# Patient Record
Sex: MALE | Race: WHITE | NOT HISPANIC OR LATINO | URBAN - METROPOLITAN AREA
[De-identification: names, ages, dates, MRNs, and addresses within clinical notes are randomized per-mention and may not be internally consistent; named-entity substitution may affect disease eponyms.]

---

## 2017-01-30 RX ORDER — ESCITALOPRAM OXALATE 10 MG/1
TABLET ORAL
Qty: 90 TABLET | Refills: 0 | Status: SHIPPED | OUTPATIENT
Start: 2017-01-30

## 2017-01-30 RX ORDER — BUPROPION HYDROCHLORIDE 300 MG/1
TABLET ORAL
Qty: 90 TABLET | Refills: 0 | Status: SHIPPED | OUTPATIENT
Start: 2017-01-30

## 2017-02-14 ENCOUNTER — TELEPHONE (OUTPATIENT)
Dept: INTERNAL MEDICINE | Age: 37
End: 2017-02-14

## 2017-02-14 NOTE — TELEPHONE ENCOUNTER
NOTIFIED PHARMACY 2-14-17 @ 11:21 THAT THE PATIENT IS NO LONGER A PATIENT OF . HE MOVED TO [place] THE FIRST OF January 2017 AND HE STATED HE IS GETTING A NEW DOCTOR. I INFORMED HIM THAT IF HE WANTED HIS RECORDS TRANSFERRED TO SIGN A MEDICAL RELEASE FORM AND WE WOULD SEND THEM TO HIM. HE AGREED TO DO SO. I ALSO INFORMED HIM THAT HE WOULD NOT GET A REFILL ON THIS MEDICATION BECAUSE IT HAS BEEN OVER A YEAR SINCE IT HAD BEEN FILLED AND HE WOULD HAVE TO BEEN SEEN FIRST. HE SAID OKAY.

## 2017-03-19 ENCOUNTER — EMERGENCY (EMERGENCY)
Facility: HOSPITAL | Age: 37
LOS: 1 days | Discharge: HOME | End: 2017-03-19
Attending: EMERGENCY MEDICINE | Admitting: EMERGENCY MEDICINE
Payer: MEDICARE

## 2017-03-19 VITALS
WEIGHT: 235.89 LBS | RESPIRATION RATE: 18 BRPM | HEART RATE: 108 BPM | TEMPERATURE: 100 F | DIASTOLIC BLOOD PRESSURE: 77 MMHG | SYSTOLIC BLOOD PRESSURE: 125 MMHG | HEIGHT: 72 IN | OXYGEN SATURATION: 95 %

## 2017-03-19 VITALS
SYSTOLIC BLOOD PRESSURE: 126 MMHG | TEMPERATURE: 99 F | HEART RATE: 114 BPM | RESPIRATION RATE: 18 BRPM | OXYGEN SATURATION: 95 % | DIASTOLIC BLOOD PRESSURE: 85 MMHG

## 2017-03-19 DIAGNOSIS — E11.9 TYPE 2 DIABETES MELLITUS WITHOUT COMPLICATIONS: ICD-10-CM

## 2017-03-19 DIAGNOSIS — R06.6 HICCOUGH: ICD-10-CM

## 2017-03-19 DIAGNOSIS — I10 ESSENTIAL (PRIMARY) HYPERTENSION: ICD-10-CM

## 2017-03-19 DIAGNOSIS — Z79.899 OTHER LONG TERM (CURRENT) DRUG THERAPY: ICD-10-CM

## 2017-03-19 LAB
ALBUMIN SERPL ELPH-MCNC: 3.9 G/DL — SIGNIFICANT CHANGE UP (ref 3.4–5)
ALP SERPL-CCNC: 83 U/L — SIGNIFICANT CHANGE UP (ref 40–120)
ALT FLD-CCNC: 134 U/L — HIGH (ref 12–42)
ANION GAP SERPL CALC-SCNC: 8 MMOL/L — LOW (ref 9–16)
AST SERPL-CCNC: 83 U/L — HIGH (ref 15–37)
BILIRUB SERPL-MCNC: 0.9 MG/DL — SIGNIFICANT CHANGE UP (ref 0.2–1.2)
BUN SERPL-MCNC: 12 MG/DL — SIGNIFICANT CHANGE UP (ref 7–23)
CALCIUM SERPL-MCNC: 8.6 MG/DL — SIGNIFICANT CHANGE UP (ref 8.5–10.5)
CHLORIDE SERPL-SCNC: 103 MMOL/L — SIGNIFICANT CHANGE UP (ref 96–108)
CO2 SERPL-SCNC: 27 MMOL/L — SIGNIFICANT CHANGE UP (ref 22–31)
CREAT SERPL-MCNC: 1.22 MG/DL — SIGNIFICANT CHANGE UP (ref 0.5–1.3)
GLUCOSE SERPL-MCNC: 118 MG/DL — HIGH (ref 70–99)
HCT VFR BLD CALC: 44.9 % — SIGNIFICANT CHANGE UP (ref 39–50)
HGB BLD-MCNC: 15.7 G/DL — SIGNIFICANT CHANGE UP (ref 13–17)
LIDOCAIN IGE QN: 91 U/L — SIGNIFICANT CHANGE UP (ref 73–393)
MCHC RBC-ENTMCNC: 29.6 PG — SIGNIFICANT CHANGE UP (ref 27–34)
MCHC RBC-ENTMCNC: 35 G/DL — SIGNIFICANT CHANGE UP (ref 32–36)
MCV RBC AUTO: 84.7 FL — SIGNIFICANT CHANGE UP (ref 80–100)
PLATELET # BLD AUTO: 195 K/UL — SIGNIFICANT CHANGE UP (ref 150–400)
POTASSIUM SERPL-MCNC: 3.8 MMOL/L — SIGNIFICANT CHANGE UP (ref 3.5–5.3)
POTASSIUM SERPL-SCNC: 3.8 MMOL/L — SIGNIFICANT CHANGE UP (ref 3.5–5.3)
PROT SERPL-MCNC: 7.5 G/DL — SIGNIFICANT CHANGE UP (ref 6.4–8.2)
RBC # BLD: 5.3 M/UL — SIGNIFICANT CHANGE UP (ref 4.2–5.8)
RBC # FLD: 12.8 % — SIGNIFICANT CHANGE UP (ref 10.3–16.9)
SODIUM SERPL-SCNC: 138 MMOL/L — SIGNIFICANT CHANGE UP (ref 135–145)
WBC # BLD: 13 K/UL — HIGH (ref 3.8–10.5)
WBC # FLD AUTO: 13 K/UL — HIGH (ref 3.8–10.5)

## 2017-03-19 PROCEDURE — 74177 CT ABD & PELVIS W/CONTRAST: CPT | Mod: 26

## 2017-03-19 PROCEDURE — 71020: CPT | Mod: 26

## 2017-03-19 PROCEDURE — 93010 ELECTROCARDIOGRAM REPORT: CPT

## 2017-03-19 PROCEDURE — 99284 EMERGENCY DEPT VISIT MOD MDM: CPT | Mod: 25

## 2017-03-19 RX ORDER — CHLORPROMAZINE HCL 10 MG
25 TABLET ORAL ONCE
Qty: 0 | Refills: 0 | Status: COMPLETED | OUTPATIENT
Start: 2017-03-19 | End: 2017-03-19

## 2017-03-19 RX ORDER — IOHEXOL 300 MG/ML
50 INJECTION, SOLUTION INTRAVENOUS ONCE
Qty: 0 | Refills: 0 | Status: COMPLETED | OUTPATIENT
Start: 2017-03-19 | End: 2017-03-19

## 2017-03-19 RX ORDER — SODIUM CHLORIDE 9 MG/ML
1000 INJECTION INTRAMUSCULAR; INTRAVENOUS; SUBCUTANEOUS ONCE
Qty: 0 | Refills: 0 | Status: COMPLETED | OUTPATIENT
Start: 2017-03-19 | End: 2017-03-19

## 2017-03-19 RX ORDER — FAMOTIDINE 10 MG/ML
20 INJECTION INTRAVENOUS ONCE
Qty: 0 | Refills: 0 | Status: COMPLETED | OUTPATIENT
Start: 2017-03-19 | End: 2017-03-19

## 2017-03-19 RX ADMIN — FAMOTIDINE 20 MILLIGRAM(S): 10 INJECTION INTRAVENOUS at 20:29

## 2017-03-19 RX ADMIN — SODIUM CHLORIDE 1000 MILLILITER(S): 9 INJECTION INTRAMUSCULAR; INTRAVENOUS; SUBCUTANEOUS at 20:29

## 2017-03-19 RX ADMIN — IOHEXOL 50 MILLILITER(S): 300 INJECTION, SOLUTION INTRAVENOUS at 20:29

## 2017-03-19 NOTE — ED PROVIDER NOTE - ENMT, MLM
Airway patent, Nasal mucosa clear. Mouth with normal mucosa. Airway patent, Nasal mucosa clear. Mouth with normal mucosa. oropharynx clear

## 2017-03-19 NOTE — ED PROVIDER NOTE - PROGRESS NOTE DETAILS
Discussed with Saloni from pharmacy re: interaction between Thorazine, Lexapro, and Wellbutrin. Concern for QT prolongation; however, pt with normal QT interval on EKG. Will give 25 mg slowly with fluid and monitor closely for hypotension, arrhythmia. Pt's hiccups stopped. Will hold Thorazine. Pt's CT scan shows no e/o obstruction/abscess. Pt's symptoms completely resolved, obs'd in ED for several hours without recurrence of sx, will discharge home. Pt's CT scan shows no e/o obstruction/abscess. Pt's symptoms completely resolved, obs'd in ED for several hours without recurrence of sx, will discharge home. Pt states he feels fine, desires to be discharged home.

## 2017-03-19 NOTE — ED PROVIDER NOTE - NEUROLOGICAL, MLM
Alert and oriented, no focal deficits, no motor or sensory deficits. Alert and oriented, no focal deficits, no motor or sensory deficits. cn grossly intact, strength 5/5 in bl ue bl le, sensation intact to

## 2017-03-19 NOTE — ED PROVIDER NOTE - OBJECTIVE STATEMENT
36M with DM, HTN, depression presenting with 30 hours of hiccups. No abd surgeries, recently tx for sinusitis with flonase. Denies 36M with DM, HTN, depression presenting with 30 hours of hiccups. No abd surgeries, recently tx for sinusitis with flonase. Denies abd pain, nausea, vomiting. Last BM yesterday, unclear if pt has had flatus. No hx of abd surg. 36M with DM, HTN, depression presenting with 30 hours of hiccups. No abd surgeries, recently tx for sinusitis with flonase. Denies abd pain, nausea, vomiting. Last BM yesterday, unclear if pt has had flatus. No hx of abd surg. Pt has tried Reglan w/o relief. 36M with DM, HTN, depression presenting with 30 hours of hiccups. No abd surgeries. Denies abd pain, nausea, vomiting. Last BM yesterday, unclear if pt has had flatus. No hx of abd surg. Pt has tried Reglan w/o relief. Denies throat pain, cough. No fever. Recently tx for sinusitis with mucinex and flonase. No recent abx use. For hiccups pt has tried home remedies of sugar water, holding breath--without relief.

## 2017-03-19 NOTE — ED ADULT NURSE NOTE - OBJECTIVE STATEMENT
Patient presents to the ED complaining of hiccups for the past 30 hours. Patient reports that they started spontaneously. Patient states that he went to an urgent care where they gave him reglan tablets, with no relief. Denies any abdominal issues. Last BM was yesterday. No fever or chills. Patient presents to the ED complaining of hiccups for the past 30 hours. Patient reports that they started spontaneously. Patient states that he went to an urgent care where they gave him reglan tablets, with no relief. Denies any abdominal issues. Last BM was yesterday. No fever or chills. Denies any chest pain.

## 2017-03-20 ENCOUNTER — INPATIENT (INPATIENT)
Facility: HOSPITAL | Age: 37
LOS: 0 days | Discharge: HOME | DRG: 871 | End: 2017-03-21
Attending: STUDENT IN AN ORGANIZED HEALTH CARE EDUCATION/TRAINING PROGRAM | Admitting: STUDENT IN AN ORGANIZED HEALTH CARE EDUCATION/TRAINING PROGRAM
Payer: COMMERCIAL

## 2017-03-20 VITALS
SYSTOLIC BLOOD PRESSURE: 157 MMHG | HEART RATE: 117 BPM | WEIGHT: 229.94 LBS | RESPIRATION RATE: 20 BRPM | TEMPERATURE: 102 F | OXYGEN SATURATION: 90 % | DIASTOLIC BLOOD PRESSURE: 94 MMHG

## 2017-03-20 DIAGNOSIS — J02.0 STREPTOCOCCAL PHARYNGITIS: ICD-10-CM

## 2017-03-20 DIAGNOSIS — R63.8 OTHER SYMPTOMS AND SIGNS CONCERNING FOOD AND FLUID INTAKE: ICD-10-CM

## 2017-03-20 DIAGNOSIS — E11.9 TYPE 2 DIABETES MELLITUS WITHOUT COMPLICATIONS: ICD-10-CM

## 2017-03-20 DIAGNOSIS — G93.40 ENCEPHALOPATHY, UNSPECIFIED: ICD-10-CM

## 2017-03-20 DIAGNOSIS — A41.9 SEPSIS, UNSPECIFIED ORGANISM: ICD-10-CM

## 2017-03-20 DIAGNOSIS — Z41.8 ENCOUNTER FOR OTHER PROCEDURES FOR PURPOSES OTHER THAN REMEDYING HEALTH STATE: ICD-10-CM

## 2017-03-20 DIAGNOSIS — I10 ESSENTIAL (PRIMARY) HYPERTENSION: ICD-10-CM

## 2017-03-20 DIAGNOSIS — R06.82 TACHYPNEA, NOT ELSEWHERE CLASSIFIED: ICD-10-CM

## 2017-03-20 LAB
ALBUMIN SERPL ELPH-MCNC: 3.9 G/DL — SIGNIFICANT CHANGE UP (ref 3.4–5)
ALP SERPL-CCNC: 79 U/L — SIGNIFICANT CHANGE UP (ref 40–120)
ALT FLD-CCNC: 122 U/L — HIGH (ref 12–42)
ANION GAP SERPL CALC-SCNC: 8 MMOL/L — LOW (ref 9–16)
APPEARANCE UR: CLEAR — SIGNIFICANT CHANGE UP
AST SERPL-CCNC: 73 U/L — HIGH (ref 15–37)
BASE EXCESS BLDV CALC-SCNC: 1.1 MMOL/L — SIGNIFICANT CHANGE UP
BASOPHILS NFR BLD AUTO: 0.1 % — SIGNIFICANT CHANGE UP (ref 0–2)
BILIRUB SERPL-MCNC: 1.8 MG/DL — HIGH (ref 0.2–1.2)
BILIRUB UR-MCNC: NEGATIVE — SIGNIFICANT CHANGE UP
BUN SERPL-MCNC: 13 MG/DL — SIGNIFICANT CHANGE UP (ref 7–23)
CALCIUM SERPL-MCNC: 9 MG/DL — SIGNIFICANT CHANGE UP (ref 8.5–10.5)
CHLORIDE SERPL-SCNC: 98 MMOL/L — SIGNIFICANT CHANGE UP (ref 96–108)
CO2 SERPL-SCNC: 29 MMOL/L — SIGNIFICANT CHANGE UP (ref 22–31)
COLOR SPEC: YELLOW — SIGNIFICANT CHANGE UP
CREAT SERPL-MCNC: 1.19 MG/DL — SIGNIFICANT CHANGE UP (ref 0.5–1.3)
DIFF PNL FLD: (no result)
EOSINOPHIL NFR BLD AUTO: 0 % — SIGNIFICANT CHANGE UP (ref 0–6)
GAS PNL BLDV: SIGNIFICANT CHANGE UP
GLUCOSE SERPL-MCNC: 173 MG/DL — HIGH (ref 70–99)
GLUCOSE UR QL: NEGATIVE — SIGNIFICANT CHANGE UP
HCO3 BLDV-SCNC: 26 MMOL/L — SIGNIFICANT CHANGE UP (ref 20–27)
HCT VFR BLD CALC: 45.1 % — SIGNIFICANT CHANGE UP (ref 39–50)
HETEROPH AB TITR SER AGGL: NEGATIVE — SIGNIFICANT CHANGE UP
HGB BLD-MCNC: 16 G/DL — SIGNIFICANT CHANGE UP (ref 13–17)
HMPV RNA SPEC QL NAA+PROBE: DETECTED
KETONES UR-MCNC: 15 MG/DL
LACTATE SERPL-SCNC: 1.9 MMOL/L — SIGNIFICANT CHANGE UP (ref 0.5–2)
LEUKOCYTE ESTERASE UR-ACNC: NEGATIVE — SIGNIFICANT CHANGE UP
LYMPHOCYTES # BLD AUTO: 3.8 % — LOW (ref 13–44)
MCHC RBC-ENTMCNC: 30.5 PG — SIGNIFICANT CHANGE UP (ref 27–34)
MCHC RBC-ENTMCNC: 35.5 G/DL — SIGNIFICANT CHANGE UP (ref 32–36)
MCV RBC AUTO: 86.1 FL — SIGNIFICANT CHANGE UP (ref 80–100)
MONOCYTES NFR BLD AUTO: 6.2 % — SIGNIFICANT CHANGE UP (ref 2–14)
NEUTROPHILS NFR BLD AUTO: 89.9 % — HIGH (ref 43–77)
NITRITE UR-MCNC: NEGATIVE — SIGNIFICANT CHANGE UP
PCO2 BLDV: 41 MMHG — SIGNIFICANT CHANGE UP (ref 41–51)
PH BLDV: 7.41 — SIGNIFICANT CHANGE UP (ref 7.32–7.43)
PH UR: 6 — SIGNIFICANT CHANGE UP (ref 4–8)
PLATELET # BLD AUTO: 181 K/UL — SIGNIFICANT CHANGE UP (ref 150–400)
PO2 BLDV: 22 MMHG — SIGNIFICANT CHANGE UP
POTASSIUM SERPL-MCNC: 3.6 MMOL/L — SIGNIFICANT CHANGE UP (ref 3.5–5.3)
POTASSIUM SERPL-SCNC: 3.6 MMOL/L — SIGNIFICANT CHANGE UP (ref 3.5–5.3)
PROT SERPL-MCNC: 8.3 G/DL — HIGH (ref 6.4–8.2)
PROT UR-MCNC: (no result) MG/DL
RAPID RVP RESULT: DETECTED
RBC # BLD: 5.24 M/UL — SIGNIFICANT CHANGE UP (ref 4.2–5.8)
RBC # FLD: 13.3 % — SIGNIFICANT CHANGE UP (ref 10.3–16.9)
S PYO AG SPEC QL IA: NEGATIVE — SIGNIFICANT CHANGE UP
SAO2 % BLDV: 42 % — SIGNIFICANT CHANGE UP
SODIUM SERPL-SCNC: 135 MMOL/L — SIGNIFICANT CHANGE UP (ref 135–145)
SP GR SPEC: 1.01 — SIGNIFICANT CHANGE UP (ref 1–1.03)
UROBILINOGEN FLD QL: 1 E.U./DL — SIGNIFICANT CHANGE UP
WBC # BLD: 16.7 K/UL — HIGH (ref 3.8–10.5)
WBC # FLD AUTO: 16.7 K/UL — HIGH (ref 3.8–10.5)

## 2017-03-20 PROCEDURE — 99284 EMERGENCY DEPT VISIT MOD MDM: CPT | Mod: 25

## 2017-03-20 PROCEDURE — 71046 X-RAY EXAM CHEST 2 VIEWS: CPT

## 2017-03-20 PROCEDURE — 74177 CT ABD & PELVIS W/CONTRAST: CPT

## 2017-03-20 PROCEDURE — 93005 ELECTROCARDIOGRAM TRACING: CPT

## 2017-03-20 PROCEDURE — 93010 ELECTROCARDIOGRAM REPORT: CPT

## 2017-03-20 PROCEDURE — 99292 CRITICAL CARE ADDL 30 MIN: CPT | Mod: 25

## 2017-03-20 PROCEDURE — 70491 CT SOFT TISSUE NECK W/DYE: CPT | Mod: 26

## 2017-03-20 PROCEDURE — 71010: CPT | Mod: 26

## 2017-03-20 PROCEDURE — 96374 THER/PROPH/DIAG INJ IV PUSH: CPT | Mod: XU

## 2017-03-20 PROCEDURE — 99222 1ST HOSP IP/OBS MODERATE 55: CPT | Mod: GC

## 2017-03-20 PROCEDURE — 99291 CRITICAL CARE FIRST HOUR: CPT | Mod: 25

## 2017-03-20 RX ORDER — AMPICILLIN SODIUM AND SULBACTAM SODIUM 250; 125 MG/ML; MG/ML
3 INJECTION, POWDER, FOR SUSPENSION INTRAMUSCULAR; INTRAVENOUS ONCE
Qty: 0 | Refills: 0 | Status: COMPLETED | OUTPATIENT
Start: 2017-03-20 | End: 2017-03-20

## 2017-03-20 RX ORDER — INSULIN LISPRO 100/ML
VIAL (ML) SUBCUTANEOUS
Qty: 0 | Refills: 0 | Status: DISCONTINUED | OUTPATIENT
Start: 2017-03-20 | End: 2017-03-21

## 2017-03-20 RX ORDER — HEPARIN SODIUM 5000 [USP'U]/ML
5000 INJECTION INTRAVENOUS; SUBCUTANEOUS EVERY 8 HOURS
Qty: 0 | Refills: 0 | Status: DISCONTINUED | OUTPATIENT
Start: 2017-03-20 | End: 2017-03-21

## 2017-03-20 RX ORDER — BUPROPION HYDROCHLORIDE 150 MG/1
1 TABLET, EXTENDED RELEASE ORAL
Qty: 0 | Refills: 0 | COMMUNITY

## 2017-03-20 RX ORDER — SODIUM CHLORIDE 9 MG/ML
1000 INJECTION INTRAMUSCULAR; INTRAVENOUS; SUBCUTANEOUS ONCE
Qty: 0 | Refills: 0 | Status: COMPLETED | OUTPATIENT
Start: 2017-03-20 | End: 2017-03-20

## 2017-03-20 RX ORDER — VALSARTAN 80 MG/1
0 TABLET ORAL
Qty: 0 | Refills: 0 | COMMUNITY

## 2017-03-20 RX ORDER — ESCITALOPRAM OXALATE 10 MG/1
1 TABLET, FILM COATED ORAL
Qty: 0 | Refills: 0 | COMMUNITY

## 2017-03-20 RX ORDER — DEXAMETHASONE 0.5 MG/5ML
10 ELIXIR ORAL ONCE
Qty: 0 | Refills: 0 | Status: COMPLETED | OUTPATIENT
Start: 2017-03-20 | End: 2017-03-20

## 2017-03-20 RX ORDER — SODIUM CHLORIDE 9 MG/ML
500 INJECTION INTRAMUSCULAR; INTRAVENOUS; SUBCUTANEOUS
Qty: 0 | Refills: 0 | Status: COMPLETED | OUTPATIENT
Start: 2017-03-20 | End: 2017-03-20

## 2017-03-20 RX ORDER — METFORMIN HYDROCHLORIDE 850 MG/1
1 TABLET ORAL
Qty: 0 | Refills: 0 | COMMUNITY

## 2017-03-20 RX ORDER — ACETAMINOPHEN 500 MG
1000 TABLET ORAL ONCE
Qty: 0 | Refills: 0 | Status: COMPLETED | OUTPATIENT
Start: 2017-03-20 | End: 2017-03-20

## 2017-03-20 RX ORDER — SODIUM CHLORIDE 9 MG/ML
3 INJECTION INTRAMUSCULAR; INTRAVENOUS; SUBCUTANEOUS ONCE
Qty: 0 | Refills: 0 | Status: COMPLETED | OUTPATIENT
Start: 2017-03-20 | End: 2017-03-20

## 2017-03-20 RX ORDER — BUPROPION HYDROCHLORIDE 150 MG/1
150 TABLET, EXTENDED RELEASE ORAL
Qty: 0 | Refills: 0 | Status: DISCONTINUED | OUTPATIENT
Start: 2017-03-20 | End: 2017-03-21

## 2017-03-20 RX ORDER — VALSARTAN 80 MG/1
1 TABLET ORAL
Qty: 0 | Refills: 0 | COMMUNITY

## 2017-03-20 RX ORDER — AMPICILLIN SODIUM AND SULBACTAM SODIUM 250; 125 MG/ML; MG/ML
3 INJECTION, POWDER, FOR SUSPENSION INTRAMUSCULAR; INTRAVENOUS EVERY 6 HOURS
Qty: 0 | Refills: 0 | Status: DISCONTINUED | OUTPATIENT
Start: 2017-03-20 | End: 2017-03-21

## 2017-03-20 RX ORDER — ESCITALOPRAM OXALATE 10 MG/1
10 TABLET, FILM COATED ORAL DAILY
Qty: 0 | Refills: 0 | Status: DISCONTINUED | OUTPATIENT
Start: 2017-03-20 | End: 2017-03-21

## 2017-03-20 RX ORDER — ACETAMINOPHEN 500 MG
650 TABLET ORAL EVERY 6 HOURS
Qty: 0 | Refills: 0 | Status: DISCONTINUED | OUTPATIENT
Start: 2017-03-20 | End: 2017-03-21

## 2017-03-20 RX ORDER — IPRATROPIUM/ALBUTEROL SULFATE 18-103MCG
3 AEROSOL WITH ADAPTER (GRAM) INHALATION ONCE
Qty: 0 | Refills: 0 | Status: COMPLETED | OUTPATIENT
Start: 2017-03-20 | End: 2017-03-20

## 2017-03-20 RX ORDER — AMPICILLIN SODIUM AND SULBACTAM SODIUM 250; 125 MG/ML; MG/ML
3 INJECTION, POWDER, FOR SUSPENSION INTRAMUSCULAR; INTRAVENOUS EVERY 6 HOURS
Qty: 0 | Refills: 0 | Status: DISCONTINUED | OUTPATIENT
Start: 2017-03-20 | End: 2017-03-20

## 2017-03-20 RX ADMIN — HEPARIN SODIUM 5000 UNIT(S): 5000 INJECTION INTRAVENOUS; SUBCUTANEOUS at 21:49

## 2017-03-20 RX ADMIN — AMPICILLIN SODIUM AND SULBACTAM SODIUM 200 GRAM(S): 250; 125 INJECTION, POWDER, FOR SUSPENSION INTRAMUSCULAR; INTRAVENOUS at 14:58

## 2017-03-20 RX ADMIN — SODIUM CHLORIDE 2000 MILLILITER(S): 9 INJECTION INTRAMUSCULAR; INTRAVENOUS; SUBCUTANEOUS at 14:49

## 2017-03-20 RX ADMIN — Medication 1000 MILLIGRAM(S): at 14:47

## 2017-03-20 RX ADMIN — Medication 102 MILLIGRAM(S): at 14:47

## 2017-03-20 RX ADMIN — SODIUM CHLORIDE 2000 MILLILITER(S): 9 INJECTION INTRAMUSCULAR; INTRAVENOUS; SUBCUTANEOUS at 17:32

## 2017-03-20 RX ADMIN — SODIUM CHLORIDE 2000 MILLILITER(S): 9 INJECTION INTRAMUSCULAR; INTRAVENOUS; SUBCUTANEOUS at 14:13

## 2017-03-20 RX ADMIN — AMPICILLIN SODIUM AND SULBACTAM SODIUM 200 GRAM(S): 250; 125 INJECTION, POWDER, FOR SUSPENSION INTRAMUSCULAR; INTRAVENOUS at 22:26

## 2017-03-20 RX ADMIN — SODIUM CHLORIDE 2000 MILLILITER(S): 9 INJECTION INTRAMUSCULAR; INTRAVENOUS; SUBCUTANEOUS at 14:02

## 2017-03-20 RX ADMIN — Medication 3 MILLILITER(S): at 17:59

## 2017-03-20 RX ADMIN — Medication 2: at 21:49

## 2017-03-20 RX ADMIN — SODIUM CHLORIDE 3 MILLILITER(S): 9 INJECTION INTRAMUSCULAR; INTRAVENOUS; SUBCUTANEOUS at 14:17

## 2017-03-20 RX ADMIN — SODIUM CHLORIDE 2000 MILLILITER(S): 9 INJECTION INTRAMUSCULAR; INTRAVENOUS; SUBCUTANEOUS at 14:08

## 2017-03-20 RX ADMIN — SODIUM CHLORIDE 2000 MILLILITER(S): 9 INJECTION INTRAMUSCULAR; INTRAVENOUS; SUBCUTANEOUS at 15:52

## 2017-03-20 RX ADMIN — SODIUM CHLORIDE 2000 MILLILITER(S): 9 INJECTION INTRAMUSCULAR; INTRAVENOUS; SUBCUTANEOUS at 14:10

## 2017-03-20 NOTE — ED PROVIDER NOTE - ENMT, MLM
Airway patent. Dry mucous membranes. Unable to full visualize oropharynx despite multiple attempts with tongue depressor. Large tongue, pt unable to relax tongue. middle-top uvula erythematous with exudates. unable to visualize tonsils. no sublingual edema. no drooling or stridor

## 2017-03-20 NOTE — H&P ADULT - NSHPPHYSICALEXAM_GEN_ALL_CORE
ICU Vital Signs Last 24 Hrs  T(C): 37.4, Max: 39 (03-20 @ 13:54)  T(F): 99.4, Max: 102.2 (03-20 @ 13:54)  HR: 98 (98 - 117)  BP: 133/63 (133/63 - 159/73)  BP(mean): --  ABP: --  ABP(mean): --  RR: 20 (20 - 30)  SpO2: 94% (88% - 94%)      PHYSICAL EXAM:      Constitutional: NAD, comfortable on venti mask  Eyes: PERRLA, EOMI  ENMT: MMM, neck supple, bilateral cervical lymphadenopathy  Respiratory: CTAB, no r/r, no stridor  Cardiovascular: RRR, audible S1S2, no murmur/rub/gallop  Gastrointestinal: Soft, NDNT  Extremities: no edema, no ulceration  Vascular: DPs and Radial pulse palpaple  Neurological: AnOx3, CN II-XII intact  Skin: no visible skin rash  Musculoskeletal: full ROM on all extremities ICU Vital Signs Last 24 Hrs  T(C): 37.4, Max: 39 (03-20 @ 13:54)  T(F): 99.4, Max: 102.2 (03-20 @ 13:54)  HR: 98 (98 - 117)  BP: 133/63 (133/63 - 159/73)  BP(mean): --  ABP: --  ABP(mean): --  RR: 20 (20 - 30)  SpO2: 94% (88% - 94%)      PHYSICAL EXAM:      Constitutional: NAD, comfortable on venti mask  Eyes: PERRLA, EOMI  ENMT: MMM, neck supple, bilateral cervical lymphadenopathy, no pharyngeal exudate, + pharyngeal erythema however poor visualization  Respiratory: CTAB, no r/r, no stridor  Cardiovascular: RRR, audible S1S2, no murmur/rub/gallop  Gastrointestinal: Soft, NDNT  Extremities: no edema, no ulceration  Vascular: DPs and Radial pulse palpaple  Neurological: AnOx3, CN II-XII intact  Skin: no visible skin rash  Musculoskeletal: full ROM on all extremities ICU Vital Signs Last 24 Hrs  T(C): 37.4, Max: 39 (03-20 @ 13:54)  T(F): 99.4, Max: 102.2 (03-20 @ 13:54)  HR: 98 (98 - 117)  BP: 133/63 (133/63 - 159/73)  BP(mean): --  ABP: --  ABP(mean): --  RR: 20 (20 - 30)  SpO2: 94% (88% - 94%)      PHYSICAL EXAM:      Constitutional: NAD, comfortable on venti mask  Eyes: PERRLA, EOMI  ENMT: MMM, neck supple, bilateral cervical lymphadenopathy, no pharyngeal exudate, + pharyngeal erythema however poor visualization, no sinus tenderness  Respiratory: CTAB, no r/r, no stridor  Cardiovascular: RRR, audible S1S2, no murmur/rub/gallop  Gastrointestinal: Soft, NDNT  Extremities: no edema, no ulceration  Vascular: DPs and Radial pulse palpaple  Neurological: AnOx3, CN II-XII intact  Skin: no visible skin rash  Musculoskeletal: full ROM on all extremities

## 2017-03-20 NOTE — H&P ADULT - NSHPREVIEWOFSYSTEMS_GEN_ALL_CORE
REVIEW OF SYSTEMS      General:	Fever, malaise    Skin/Breast: no rash  	  Ophthalmologic: no visual changes  	  ENMT: sorethroat	    Respiratory and Thorax: no SOB  	  Cardiovascular: no chest pain	    Gastrointestinal:	no c/d    Genitourinary:	no dysuria    Musculoskeletal:	no joint pain    Neurological: no focal weakness/numbness	    Psychiatric:	no SI/HI

## 2017-03-20 NOTE — ED PROVIDER NOTE - CRITICAL CARE PROVIDED
consultation with other physicians/direct patient care (not related to procedure)/interpretation of diagnostic studies/additional history taking/documentation/consult w/ pt's family directly relating to pts condition

## 2017-03-20 NOTE — ED PROVIDER NOTE - CARE PLAN
Principal Discharge DX:	Sepsis, due to unspecified organism  Secondary Diagnosis:	Acute pharyngitis, unspecified etiology  Secondary Diagnosis:	Human metapneumovirus as the cause of diseases classified elsewhere

## 2017-03-20 NOTE — ED PROVIDER NOTE - SECONDARY DIAGNOSIS.
Acute pharyngitis, unspecified etiology Human metapneumovirus as the cause of diseases classified elsewhere

## 2017-03-20 NOTE — ED PROVIDER NOTE - DIAGNOSTIC INTERPRETATION
ER Physician: Evelyne Valles, DO  CHEST XRAY INTERPRETATION: lungs clear, heart shadow normal, bony structures intact. elevated R hemidiaphragm

## 2017-03-20 NOTE — H&P ADULT - PROBLEM SELECTOR PLAN 1
Fever, tachycardic, tachypnea, likely due to pharyngitis bacterial vs viral (hMVP). No other source (no GI/ complaint, negative Xray).   -rapid strep negative, however will f/u with throat culture. Continue with Unasyn for now.  -Lactate 1.9. S/p 4L NS in the ED, will hold further fluid for now as patient appeared euvolemic, will resume PO diet.  -Will f/u with blood culture. Fever, tachycardic, tachypnea, likely due to pharyngitis bacterial vs viral (hMVP vs mono). No other source (no GI/ complaint, negative Xray).   -rapid strep negative, however will f/u with throat culture. Continue with Unasyn for now.  -Lactate 1.9. S/p 4L NS in the ED, will hold further fluid for now as patient appeared euvolemic, will resume PO diet.  -Will f/u with blood culture. WBC, Fever, tachycardic, tachypnea, likely due to pharyngitis bacterial vs viral (hMVP vs mono). No other source (no GI/ complaint, negative Xray).   -rapid strep negative, however will f/u with throat culture. Continue with Unasyn for now.  -Lactate 1.9. S/p 4L NS in the ED, will hold further fluid for now as patient appeared euvolemic, will resume PO diet.  -Will f/u with blood culture.

## 2017-03-20 NOTE — H&P ADULT - HISTORY OF PRESENT ILLNESS
35 yo female with DM, HTN, ANAM on CPAP at night, depression, who presented with fever today. Patient stated he had sore throat for the past week, worsening, no associated with cough, not fever until today, no sick contact, no recent travel, no shortness of breath before admission. Also denied constipation/ diarrhea, no dysuria, no ulcers/wounds. Also reported some nasal discharge, not purulent, no sinus pain or congestion. 35 yo female with DM2, HTN, ANAM on CPAP at night, depression, who presented with fever today. Patient stated he had sore throat for the past week, worsening, no associated with cough, not fever until today, no sick contact, no recent travel, no shortness of breath before admission. Also denied constipation/ diarrhea, no dysuria, no ulcers/wounds. Also reported some nasal discharge, not purulent, no sinus pain or congestion.  Seen in ED last night for intratable hiccup, had negative CT abdomen and pelvis and was sent home. Came back to ED again today for 1 day of subjective fever and malaise.  In the ED, vitals were were T 102.2, , /94, O2 90% RA, RR 20, patient was given tylenol, decadron 10mg IV for neck swelling, given 4L NS boluses. Patient was also started on Unasyn for pharyngitis. CT neck showed pharyngitis and sinusitis. Patient suddenly became tachypnic and lethargic and confused, so patient was put on Venti mask at 50% 02. ICU was consulted. Patient however became more alert and stable. MICU stated patient is stable for floor.     PMH: as above  PSH: none  SH: no tob, no alcohol, no drug  FH: non-contributary  ALL: NKDA 35 yo male with DM2, HTN, ANAM on CPAP at night, depression, who presented with fever today. Patient stated he had sore throat for the past week, worsening, no associated with cough, not fever until today, no sick contact, no recent travel, no shortness of breath before admission. Also denied constipation/ diarrhea, no dysuria, no ulcers/wounds. Also reported some nasal discharge, not purulent, no sinus pain or congestion.  Seen in ED last night for intratable hiccup, had negative CT abdomen and pelvis and was sent home. Came back to ED again today for 1 day of subjective fever and malaise.  In the ED, vitals were were T 102.2, , /94, O2 90% RA, RR 20, patient was given tylenol, decadron 10mg IV for neck swelling, given 4L NS boluses. Patient was also started on Unasyn for pharyngitis. CT neck showed pharyngitis and sinusitis. Patient suddenly became tachypnic and lethargic and confused, so patient was put on Venti mask at 50% 02. ICU was consulted. Patient however became more alert and stable. MICU stated patient is stable for floor.     PMH: as above  PSH: none  SH: no tob, no alcohol, no drug  FH: non-contributary  ALL: NKDA

## 2017-03-20 NOTE — ED PROVIDER NOTE - MUSCULOSKELETAL, MLM
Spine appears normal, range of motion is not limited, no muscle or joint tenderness. supple neck, FROM. no meningeal signs

## 2017-03-20 NOTE — ED ADULT TRIAGE NOTE - CHIEF COMPLAINT QUOTE
weakness and cough since last night. Patient febrile on arrival. Patient reports "I was here yesterday for uncontrollable hiccups that eventually stopped on their own."

## 2017-03-20 NOTE — ED PROVIDER NOTE - PROGRESS NOTE DETAILS
Pt seen by MICU team - admit to medicine, regional Pt with increasing RR, hypoxia. Called for BiPAP. MICU eval called. Pt placed on venti-mask, ABG performed by MICU staff. Pt much more awake, alert comfortable. Pt not placed on BiPAP at this time

## 2017-03-20 NOTE — ED PROVIDER NOTE - PSYCHIATRIC, MLM
Alert and oriented to person, place, time/situation. flat, depressed affect. no apparent risk to self or others.

## 2017-03-20 NOTE — ED PROVIDER NOTE - RESPIRATORY, MLM
Breaths sounds equal and diminished b/l. No W/R/R. No increased WOB or accessory mm use. Mild tachypnea. O2 sat on RA 89%. Pt speaks in full sentences.

## 2017-03-20 NOTE — ED PROVIDER NOTE - OBJECTIVE STATEMENT
Pt with PMHx DM, HTN, depression, ANAM p/w fever, onset this morning, generalized weakness, feeling dehydrated, mild cough. Pt [place] [facility] [place] sore throat and nasal congestion. Pt was told he had sinusitis, but no abx given. Pt reports continued congestion. No purulent nasal discharge. No HA, neck pain or stiffness. No CP, SOB. No abdominal pain, n/v/d. No rash or skin infections. No F/U/D or hematuria. Pt reports his voice is "weak." No recent travel. no sick contacts. Pt did not receive a flu vaccine this year. Pt denies IVDA. Denies high-risk HIV behavior. Declines HIV testing. Denies SI, HI, AH/VH, and ingestions. Of note, pt seen the ED yesterday for hiccups. Pt had blood work, CXR, and CT abd / pelv with PO / IV contrast - WBC 13. CT revealed small hiatal hernia and GERD. CXR negative. Pt reports he felt okay when he left the ED, and woke up this way this morning. Pt with PMHx DM, HTN, depression, ANAM (CPAP at night) p/w fever, onset this morning, generalized weakness, feeling dehydrated, mild cough. Pt reports he was seen at [facility] [place] [place] ago for sore throat and nasal congestion. Pt was told he had sinusitis, but no abx given. Pt reports continued congestion. No purulent nasal discharge. No HA, neck pain or stiffness. No CP, SOB. No abdominal pain, n/v/d. No rash or skin infections. No F/U/D or hematuria. Pt reports his voice is "weak." No recent travel. no sick contacts. Pt did not receive a flu vaccine this year. Pt denies IVDA. Denies high-risk HIV behavior. Declines HIV testing. Denies SI, HI, AH/VH, and ingestions. Of note, pt seen the ED yesterday for hiccups. Pt had blood work, CXR, and CT abd / pelv with PO / IV contrast - WBC 13. CT revealed small hiatal hernia and GERD. CXR negative. Pt reports he felt okay when he left the ED, and woke up this way this morning.

## 2017-03-20 NOTE — H&P ADULT - NSHPLABSRESULTS_GEN_ALL_CORE
16.0   16.7  )-----------( 181      ( 20 Mar 2017 14:20 )             45.1     20 Mar 2017 14:20    135    |  98     |  13     ----------------------------<  173    3.6     |  29     |  1.19     Ca    9.0        20 Mar 2017 14:20    TPro  8.3    /  Alb  3.9    /  TBili  1.8    /  DBili  x      /  AST  73     /  ALT  122    /  AlkPhos  79     20 Mar 2017 14:20

## 2017-03-20 NOTE — ED PROVIDER NOTE - MEDICAL DECISION MAKING DETAILS
Pt p/w fever, gen weakness. Recent sore throat, congestion. Pt in ED yesterday for intractable hiccups. Pt had CT a/p with IV and PO contrast at that time, negative, therefore doubt intra-abdominal infection, liver / splenic abscess with diaphragmatic irritation. no abdominal pain, no GI sx. Mild cough. Probable throat infection. Limited exam. Given limited exam, pt's vital signs, will get STAT CT neck with IV contrast. Throat swabbed. RVP sent given possible flu. Unasyn, IV steroids given for pharyngitis. UA, UCx, Blood Cx, IVF. Probable admission

## 2017-03-20 NOTE — H&P ADULT - ATTENDING COMMENTS
pt seen and examined; reviewed h&p, vs, labs, ekg, cxr, ct report  pt a/f sepsis in setting of pharyngitis, found to have +hMVP on RVP  PE  gen: awake alert, NAD, sweating, on NC  heent: perrla, no photophobia, no nuchal rigidity; no frontal/ maxillary sinus tenderness b/l;  MMM;  there is erythema of the oropharyngeal region, there is ulcers noted at the uvula; there is b/l cervical LAD, nontender to patient  cvs: s1s2  lungs: CTA b/l   abd: nt/nd  ext: no lower ext edema/ tenderness b/l   1. sepsis/ pharyngitis/ sinusistis: follow up ctxs; c/w unasyn for now   2. tachypnea: improved ; on NC at time of evaluation; monitor for worsening sxs  3. HTN: agree w/ holding ARB for now; restart if bp increasing and/ or sepsis resolving

## 2017-03-20 NOTE — H&P ADULT - PROBLEM SELECTOR PLAN 2
Likely sepsis driven, doubt PE as no LE edema, no prolonged sitting, no recent long travel. CXR clear, no cough so doubt PNA.  -Currently satting at 95% on Venti mask 50%, appeared comfortable, will wean down to NC. If still tachypneic, consider PE work up with doppler. Likely sepsis driven, now improving with fever curve trending down, doubt PE as no LE edema, no prolonged sitting, no recent long travel. CXR clear, no cough so doubt PNA.  -Currently satting at 95% on Venti mask 50%, appeared comfortable, will wean down to NC. If still tachypneic, consider PE work up with doppler.

## 2017-03-20 NOTE — CONSULT NOTE ADULT - SUBJECTIVE AND OBJECTIVE BOX
36 yr old M w/a PMH of HTN, DM, Depression and ANAM with CPAP at night presents with generalized weakness.  Patient reports for the past week he has been suffering from a sore throat associated with nasal congestion.  He states he went to the urgent care center and was diagnosed with a viral sinusitis not given antibiotics.  Additionally patient reports yesterday he began suffering from intractable hiccups he states he was having hiccups for 30 hours without relief had a ct abdomen and pelvis with no acute findings and discharged home.  Today he reported feeling weak and really dehydrated with a mild cough.      PAST MEDICAL & SURGICAL HISTORY:  Diabetes  HTN (hypertension)  Depression      AMILY HISTORY:    Allergies    No Known Allergies    Intolerances      OBJECTIVE  PHYSICAL EXAM:  T(C): 38.3, Max: 39 (03-20 @ 13:54)  HR: 100 (99 - 117)  BP: 140/77 (140/77 - 159/73)  RR: 30 (20 - 30)  SpO2: 93% (88% - 93%)  Wt(kg): --  I&O's Summary      Appearance: Mild Resp distress   HEENT:   Erythematous pharnx with appearance of exudate. Dry MM   Cardiovascular: Tachycardiac with no m/r/g.  Respiratory: Lungs CTAB. decreased BS at bases   Gastrointestinal:  Soft, nontender. No rebound. No rigidity. 	  Skin: No rashes, No ecchymoses, No cyanosis.	  Neurologic: Non-focal. Moving all extremities. Following commands.  Extremities: No edema. No erythema. No calf tenderness. Williams's negative.  Vascular: DP 2+ b/l.  	  LABS:                        16.0   16.7  )-----------( 181      ( 20 Mar 2017 14:20 )             45.1     20 Mar 2017 14:20    135    |  98     |  13     ----------------------------<  173    3.6     |  29     |  1.19     Ca    9.0        20 Mar 2017 14:20    TPro  8.3    /  Alb  3.9    /  TBili  1.8    /  DBili  x      /  AST  73     /  ALT  122    /  AlkPhos  79     20 Mar 2017 14:20          RADIOLOGY & ADDITIONAL TESTS:    ASSESSMENT/PLAN: 36 yr old M w/a PMH of HTN, DM, Depression and ANAM with CPAP at night presents with generalized weakness.  Patient reports for the past week he has been suffering from a sore throat associated with nasal congestion.  He states he went to the urgent care center and was diagnosed with a viral sinusitis not given antibiotics.  Additionally patient reports yesterday he began suffering from intractable hiccups he states he was having hiccups for 30 hours without relief had a ct abdomen and pelvis with no acute findings and discharged home.  Today he reported feeling weak and really dehydrated with a mild cough.      PAST MEDICAL & SURGICAL HISTORY:  Diabetes  HTN (hypertension)  Depression      AMILY HISTORY:    Allergies    No Known Allergies    Intolerances      OBJECTIVE  PHYSICAL EXAM:  T(C): 38.3, Max: 39 (03-20 @ 13:54)  HR: 100 (99 - 117)  BP: 140/77 (140/77 - 159/73)  RR: 30 (20 - 30)  SpO2: 93% (88% - 93%)  Wt(kg): --  I&O's Summary      Appearance: Mild Resp distress   HEENT:   Erythematous pharnx with appearance of exudate. Dry MM   Cardiovascular: Tachycardiac with no m/r/g.  Respiratory: Lungs CTAB. decreased BS at bases   Gastrointestinal:  Soft, nontender. No rebound. No rigidity. 	  Skin: No rashes, No ecchymoses, No cyanosis.	  Neurologic: Non-focal. Moving all extremities. Following commands.  Extremities: No edema. No erythema. No calf tenderness. Williams's negative.  Vascular: DP 2+ b/l.  	  LABS:                        16.0   16.7  )-----------( 181      ( 20 Mar 2017 14:20 )             45.1     20 Mar 2017 14:20    135    |  98     |  13     ----------------------------<  173    3.6     |  29     |  1.19     Ca    9.0        20 Mar 2017 14:20    TPro  8.3    /  Alb  3.9    /  TBili  1.8    /  DBili  x      /  AST  73     /  ALT  122    /  AlkPhos  79     20 Mar 2017 14:20          RADIOLOGY & ADDITIONAL TESTS:    MPRESSION:    CT neck and soft tissue:    1. Finding consistent with acute pharyngitis with reactive cervical   lymphadenopathy. No abscess.    2. Mucosal thickening of paranasal sinuses consistent with pansinusitis.    X-ray Chest-  Clear lungs     ASSESSMENT/PLAN: 	      36 yr old non IDDM, HTN, ANAM and Depression presents       Sepsis:  Etiology appears to be multifactorial with viral infection along with bacterial pharyngitis vs sinusitis.    -Contact for Metapneumovirus  -Start Unasyn for pharyngitis vs sinusitis   -F/U blood cultures  -Continue maintenance fluids  -Chest xray appears clear   -F/U cultures 36 yr old M w/a PMH of HTN, DM, Depression and ANAM with CPAP at night presents with generalized weakness.  Patient reports for the past week he has been suffering from a sore throat associated with nasal congestion.  He states he went to the urgent care center and was diagnosed with a viral sinusitis not given antibiotics.  Additionally patient reports yesterday he began suffering from intractable hiccups he states he was having hiccups for 30 hours without relief had a ct abdomen and pelvis with no acute findings and discharged home.  Today he reported feeling weak and really dehydrated with a mild cough.      PAST MEDICAL & SURGICAL HISTORY:  Diabetes  HTN (hypertension)  Depression      AMILY HISTORY:    Allergies    No Known Allergies    Intolerances      OBJECTIVE  PHYSICAL EXAM:  T(C): 38.3, Max: 39 (03-20 @ 13:54)  HR: 100 (99 - 117)  BP: 140/77 (140/77 - 159/73)  RR: 30 (20 - 30)  SpO2: 93% (88% - 93%)  Wt(kg): --  I&O's Summary      Appearance: Mild Resp distress   HEENT:   Erythematous pharnx with appearance of exudate. Dry MM   Cardiovascular: Tachycardiac with no m/r/g.  Respiratory: Lungs CTAB. decreased BS at bases   Gastrointestinal:  Soft, nontender. No rebound. No rigidity. 	  Skin: No rashes, No ecchymoses, No cyanosis.	  Neurologic: Non-focal. Moving all extremities. Following commands.  Extremities: No edema. No erythema. No calf tenderness. Williams's negative.  Vascular: DP 2+ b/l.  	  LABS:                        16.0   16.7  )-----------( 181      ( 20 Mar 2017 14:20 )             45.1     20 Mar 2017 14:20    135    |  98     |  13     ----------------------------<  173    3.6     |  29     |  1.19     Ca    9.0        20 Mar 2017 14:20    TPro  8.3    /  Alb  3.9    /  TBili  1.8    /  DBili  x      /  AST  73     /  ALT  122    /  AlkPhos  79     20 Mar 2017 14:20          RADIOLOGY & ADDITIONAL TESTS:    MPRESSION:    CT neck and soft tissue:    1. Finding consistent with acute pharyngitis with reactive cervical   lymphadenopathy. No abscess.    2. Mucosal thickening of paranasal sinuses consistent with pansinusitis.    X-ray Chest-  Clear lungs     ASSESSMENT/PLAN: 	      36 yr old non IDDM, HTN, ANAM and Depression presents       Sepsis:  Etiology appears to be multifactorial with viral infection along with bacterial pharyngitis vs sinusitis.    -Contact for Metapneumovirus  -Start Unasyn for pharyngitis vs sinusitis   -F/U blood cultures  -Continue maintenance fluids  -Chest xray appears clear and patient not tachypneic however only 94% on 4LNC.  Concern for PE low on the differential would consider D-dimer if elevated then LE duplex   -F/U cultures

## 2017-03-21 VITALS
TEMPERATURE: 98 F | OXYGEN SATURATION: 93 % | SYSTOLIC BLOOD PRESSURE: 145 MMHG | HEART RATE: 93 BPM | DIASTOLIC BLOOD PRESSURE: 92 MMHG | RESPIRATION RATE: 20 BRPM

## 2017-03-21 DIAGNOSIS — J01.90 ACUTE SINUSITIS, UNSPECIFIED: ICD-10-CM

## 2017-03-21 DIAGNOSIS — B97.81 HUMAN METAPNEUMOVIRUS AS THE CAUSE OF DISEASES CLASSIFIED ELSEWHERE: ICD-10-CM

## 2017-03-21 DIAGNOSIS — J02.0 STREPTOCOCCAL PHARYNGITIS: ICD-10-CM

## 2017-03-21 DIAGNOSIS — G93.40 ENCEPHALOPATHY, UNSPECIFIED: ICD-10-CM

## 2017-03-21 DIAGNOSIS — A40.9 STREPTOCOCCAL SEPSIS, UNSPECIFIED: ICD-10-CM

## 2017-03-21 LAB
ALBUMIN SERPL ELPH-MCNC: 3.2 G/DL — LOW (ref 3.4–5)
ALP SERPL-CCNC: 67 U/L — SIGNIFICANT CHANGE UP (ref 40–120)
ALT FLD-CCNC: 84 U/L — HIGH (ref 12–42)
ANISOCYTOSIS BLD QL: SLIGHT — SIGNIFICANT CHANGE UP
AST SERPL-CCNC: 52 U/L — HIGH (ref 15–37)
BILIRUB DIRECT SERPL-MCNC: 0.18 MG/DL — SIGNIFICANT CHANGE UP
BILIRUB INDIRECT FLD-MCNC: 0.9 MG/DL — SIGNIFICANT CHANGE UP (ref 0.2–1)
BILIRUB SERPL-MCNC: 1.1 MG/DL — SIGNIFICANT CHANGE UP (ref 0.2–1.2)
CMV IGG FLD QL: <0.2 U/ML — SIGNIFICANT CHANGE UP
CMV IGG SERPL-IMP: NEGATIVE — SIGNIFICANT CHANGE UP
CMV IGM FLD-ACNC: <8 AU/ML — SIGNIFICANT CHANGE UP
CMV IGM SERPL QL: NEGATIVE — SIGNIFICANT CHANGE UP
CRP SERPL-MCNC: 27.7 MG/DL — HIGH
CULTURE RESULTS: NO GROWTH — SIGNIFICANT CHANGE UP
DOHLE BOD BLD QL SMEAR: SIGNIFICANT CHANGE UP
ERYTHROCYTE [SEDIMENTATION RATE] IN BLOOD: 60 MM/HR — HIGH
HAV IGM SER-ACNC: SIGNIFICANT CHANGE UP
HBV CORE IGM SER-ACNC: SIGNIFICANT CHANGE UP
HBV SURFACE AG SER-ACNC: SIGNIFICANT CHANGE UP
HCT VFR BLD CALC: 44.4 % — SIGNIFICANT CHANGE UP (ref 39–50)
HCV AB S/CO SERPL IA: 0.13 S/CO — SIGNIFICANT CHANGE UP
HCV AB SERPL-IMP: SIGNIFICANT CHANGE UP
HGB BLD-MCNC: 15.6 G/DL — SIGNIFICANT CHANGE UP (ref 13–17)
LYMPHOCYTES # BLD AUTO: 1 % — LOW (ref 13–44)
MANUAL DIF COMMENT BLD-IMP: SIGNIFICANT CHANGE UP
MANUAL SMEAR VERIFICATION: SIGNIFICANT CHANGE UP
MCHC RBC-ENTMCNC: 29.8 PG — SIGNIFICANT CHANGE UP (ref 27–34)
MCHC RBC-ENTMCNC: 35.1 G/DL — SIGNIFICANT CHANGE UP (ref 32–36)
MCV RBC AUTO: 84.9 FL — SIGNIFICANT CHANGE UP (ref 80–100)
MICROCYTES BLD QL: SLIGHT — SIGNIFICANT CHANGE UP
MONOCYTES NFR BLD AUTO: 2 % — SIGNIFICANT CHANGE UP (ref 2–14)
NEUTROPHILS NFR BLD AUTO: 61 % — SIGNIFICANT CHANGE UP (ref 43–77)
NEUTROPHILS NFR BLD AUTO: 61 % — SIGNIFICANT CHANGE UP (ref 43–77)
NEUTS BAND # BLD: 36 % — HIGH
PLAT MORPH BLD: (no result)
PLAT MORPH BLD: (no result)
PLATELET # BLD AUTO: 192 K/UL — SIGNIFICANT CHANGE UP (ref 150–400)
PROT SERPL-MCNC: 7 G/DL — SIGNIFICANT CHANGE UP (ref 6.4–8.2)
RBC # BLD: 5.23 M/UL — SIGNIFICANT CHANGE UP (ref 4.2–5.8)
RBC # FLD: 13.3 % — SIGNIFICANT CHANGE UP (ref 10.3–16.9)
RBC BLD AUTO: (no result)
RBC BLD AUTO: (no result)
SPECIMEN SOURCE: SIGNIFICANT CHANGE UP
WBC # BLD: 26.5 K/UL — HIGH (ref 3.8–10.5)
WBC # FLD AUTO: 26.5 K/UL — HIGH (ref 3.8–10.5)

## 2017-03-21 PROCEDURE — 81001 URINALYSIS AUTO W/SCOPE: CPT

## 2017-03-21 PROCEDURE — 99285 EMERGENCY DEPT VISIT HI MDM: CPT | Mod: 25

## 2017-03-21 PROCEDURE — 83605 ASSAY OF LACTIC ACID: CPT

## 2017-03-21 PROCEDURE — 87581 M.PNEUMON DNA AMP PROBE: CPT

## 2017-03-21 PROCEDURE — 99239 HOSP IP/OBS DSCHRG MGMT >30: CPT

## 2017-03-21 PROCEDURE — 80076 HEPATIC FUNCTION PANEL: CPT

## 2017-03-21 PROCEDURE — 70491 CT SOFT TISSUE NECK W/DYE: CPT

## 2017-03-21 PROCEDURE — 87880 STREP A ASSAY W/OPTIC: CPT

## 2017-03-21 PROCEDURE — 80074 ACUTE HEPATITIS PANEL: CPT

## 2017-03-21 PROCEDURE — 80053 COMPREHEN METABOLIC PANEL: CPT

## 2017-03-21 PROCEDURE — 85027 COMPLETE CBC AUTOMATED: CPT

## 2017-03-21 PROCEDURE — 87040 BLOOD CULTURE FOR BACTERIA: CPT

## 2017-03-21 PROCEDURE — 81003 URINALYSIS AUTO W/O SCOPE: CPT

## 2017-03-21 PROCEDURE — 87798 DETECT AGENT NOS DNA AMP: CPT

## 2017-03-21 PROCEDURE — 94640 AIRWAY INHALATION TREATMENT: CPT

## 2017-03-21 PROCEDURE — 83036 HEMOGLOBIN GLYCOSYLATED A1C: CPT

## 2017-03-21 PROCEDURE — 86645 CMV ANTIBODY IGM: CPT

## 2017-03-21 PROCEDURE — 87633 RESP VIRUS 12-25 TARGETS: CPT

## 2017-03-21 PROCEDURE — 86140 C-REACTIVE PROTEIN: CPT

## 2017-03-21 PROCEDURE — 85652 RBC SED RATE AUTOMATED: CPT

## 2017-03-21 PROCEDURE — 36415 COLL VENOUS BLD VENIPUNCTURE: CPT

## 2017-03-21 PROCEDURE — 94660 CPAP INITIATION&MGMT: CPT

## 2017-03-21 PROCEDURE — 83690 ASSAY OF LIPASE: CPT

## 2017-03-21 PROCEDURE — 85025 COMPLETE CBC W/AUTO DIFF WBC: CPT

## 2017-03-21 PROCEDURE — 96374 THER/PROPH/DIAG INJ IV PUSH: CPT | Mod: XU

## 2017-03-21 PROCEDURE — 87486 CHLMYD PNEUM DNA AMP PROBE: CPT

## 2017-03-21 PROCEDURE — 93005 ELECTROCARDIOGRAM TRACING: CPT

## 2017-03-21 PROCEDURE — 96375 TX/PRO/DX INJ NEW DRUG ADDON: CPT | Mod: XU

## 2017-03-21 PROCEDURE — 87086 URINE CULTURE/COLONY COUNT: CPT

## 2017-03-21 PROCEDURE — 86644 CMV ANTIBODY: CPT

## 2017-03-21 PROCEDURE — 71045 X-RAY EXAM CHEST 1 VIEW: CPT

## 2017-03-21 PROCEDURE — 82803 BLOOD GASES ANY COMBINATION: CPT

## 2017-03-21 PROCEDURE — 87081 CULTURE SCREEN ONLY: CPT

## 2017-03-21 PROCEDURE — 86308 HETEROPHILE ANTIBODY SCREEN: CPT

## 2017-03-21 PROCEDURE — 80048 BASIC METABOLIC PNL TOTAL CA: CPT

## 2017-03-21 RX ORDER — POTASSIUM CHLORIDE 20 MEQ
40 PACKET (EA) ORAL ONCE
Qty: 0 | Refills: 0 | Status: COMPLETED | OUTPATIENT
Start: 2017-03-21 | End: 2017-03-21

## 2017-03-21 RX ORDER — AMOXICILLIN 250 MG/5ML
1 SUSPENSION, RECONSTITUTED, ORAL (ML) ORAL
Qty: 10 | Refills: 0 | OUTPATIENT
Start: 2017-03-21 | End: 2017-03-31

## 2017-03-21 RX ORDER — POTASSIUM CHLORIDE 20 MEQ
10 PACKET (EA) ORAL
Qty: 0 | Refills: 0 | Status: DISCONTINUED | OUTPATIENT
Start: 2017-03-21 | End: 2017-03-21

## 2017-03-21 RX ADMIN — ESCITALOPRAM OXALATE 10 MILLIGRAM(S): 10 TABLET, FILM COATED ORAL at 11:31

## 2017-03-21 RX ADMIN — BUPROPION HYDROCHLORIDE 150 MILLIGRAM(S): 150 TABLET, EXTENDED RELEASE ORAL at 06:16

## 2017-03-21 RX ADMIN — AMPICILLIN SODIUM AND SULBACTAM SODIUM 200 GRAM(S): 250; 125 INJECTION, POWDER, FOR SUSPENSION INTRAMUSCULAR; INTRAVENOUS at 10:29

## 2017-03-21 RX ADMIN — HEPARIN SODIUM 5000 UNIT(S): 5000 INJECTION INTRAVENOUS; SUBCUTANEOUS at 13:34

## 2017-03-21 RX ADMIN — AMPICILLIN SODIUM AND SULBACTAM SODIUM 200 GRAM(S): 250; 125 INJECTION, POWDER, FOR SUSPENSION INTRAMUSCULAR; INTRAVENOUS at 03:18

## 2017-03-21 RX ADMIN — HEPARIN SODIUM 5000 UNIT(S): 5000 INJECTION INTRAVENOUS; SUBCUTANEOUS at 06:16

## 2017-03-21 RX ADMIN — Medication 40 MILLIEQUIVALENT(S): at 10:30

## 2017-03-21 NOTE — DISCHARGE NOTE ADULT - ADDITIONAL INSTRUCTIONS
Continue your home medications as before, no changes were made to these.  You should also take Augmentin twice per day for 10 days.  You have an appointment with Dr. Hardy on April 7th, however his office will call you to schedule an appointment sooner. Return to the ED if you have worsening pain, shortness of breath, unable to eat or drink or have any concerns.

## 2017-03-21 NOTE — DISCHARGE NOTE ADULT - HOSPITAL COURSE
37 yo male with DM2, HTN, ANAM on CPAP at night, depression, who presented with fever. Patient stated he had sore throat for the past week, worsening, no associated with cough, not fever until today, no sick contact, no recent travel, no shortness of breath before admission. Also denied constipation/ diarrhea, no dysuria, no ulcers/wounds. Also reported some nasal discharge, not purulent, no sinus pain or congestion.  Seen in ED on the night prior to admission for intratable hiccup, had negative CT abdomen and pelvis and was sent home.   In the ED, vitals were T 102.2, , /94, O2 90% RA, RR 20, patient was given tylenol, decadron 10mg IV for neck swelling, given 4L NS boluses. Patient was also started on Unasyn for pharyngitis. CT neck showed pharyngitis and sinusitis. Patient suddenly became tachypnic and lethargic and confused, so patient was put on Venti mask at 50% 02.  CT neck showed no abscess, only lymphadenopathy and non obstructive mild edema of the pharynx.  His confusion resolved and he was protecting his airway the whole time.  He was admitted for observation, blood cultures drawn were negative and throat culture was positive for group A streptococcus.  His viral panel was also positive for human metapneumovirus.  He was observed for 24 hours without further events, and discharged on Amoxacillin 10 day course for strep pharyngitis and given an appointment to follow up with his PCP Dr. Hardy at [facility]. 35 yo male with DM2, HTN, ANAM on CPAP at night, depression, who presented with fever. Patient stated he had sore throat for the past week, worsening, no associated with cough, not fever until today, no sick contact, no recent travel, no shortness of breath before admission. Also denied constipation/ diarrhea, no dysuria, no ulcers/wounds. Also reported some nasal discharge, not purulent, no sinus pain or congestion.  Seen in ED on the night prior to admission for intratable hiccup, had negative CT abdomen and pelvis and was sent home.   In the ED, vitals were T 102.2, , /94, O2 90% RA, RR 20, patient was given tylenol, decadron 10mg IV for neck swelling, given 4L NS boluses. Patient was also started on Unasyn for pharyngitis. CT neck showed pharyngitis and sinusitis. Patient suddenly became tachypnic and lethargic and confused, so patient was put on Venti mask at 50% 02.  CT neck showed no abscess, only lymphadenopathy and non obstructive mild edema of the pharynx.  His confusion resolved and he was protecting his airway the whole time.  He was admitted for observation, blood cultures drawn were negative and throat culture was positive for group A streptococcus.  His viral panel was also positive for human metapneumovirus.  He was observed for 24 hours without further events, and discharged on Augmentin 10 day course for strep pharyngitis and sinusitis and given an appointment to follow up with his PCP Dr. Hardy at [facility].

## 2017-03-21 NOTE — PROGRESS NOTE ADULT - SUBJECTIVE AND OBJECTIVE BOX
Patient is a 36y old  Male who presents with a chief complaint of Fever (21 Mar 2017 14:53)      INTERVAL HPI/OVERNIGHT EVENTS:      feels well  minimal sore throat  no facial pain, headache, neck pain  afebrile    Review of Systems:      ( -  ) chills  ( - ) dyspnea  (  - ) cough  (  - ) chest pain  (  - ) palpatations  ( - ) dizziness/lightheadedness  (  - ) nausea/vomiting  (  - ) abd pain  (  - ) diarrhea  (  - ) melena  (  - ) hematochezia  (  - ) dysuria  ( - ) hematuria  (  - ) leg swelling  ( -) calf tenderness  (  - ) motor weakness  (  - ) extremity numbness  ( - ) back pain  ( + ) tolerating POs  ( + ) BM  ROS: 12 point review of systems otherwise negative              MEDICATIONS  (STANDING):  insulin lispro (HumaLOG) corrective regimen sliding scale  SubCutaneous Before meals and at bedtime  escitalopram 10milliGRAM(s) Oral daily  buPROPion SR 150milliGRAM(s) Oral two times a day  heparin  Injectable 5000Unit(s) SubCutaneous every 8 hours  ampicillin/sulbactam  IVPB 3Gram(s) IV Intermittent every 6 hours    MEDICATIONS  (PRN):  acetaminophen   Tablet 650milliGRAM(s) Oral every 6 hours PRN For Temp greater than 38 C (100.4 F)      Allergies    No Known Allergies    Intolerances          Vital Signs Last 24 Hrs  T(C): 36.8, Max: 38.3 ( @ 15:57)  T(F): 98.2, Max: 101 ( @ 15:57)  HR: 83 (79 - 100)  BP: 136/81 (133/63 - 147/87)  BP(mean): --  RR: 20 (18 - 30)  SpO2: 96% (93% - 98%)  CAPILLARY BLOOD GLUCOSE  91 (21 Mar 2017 13:03)  114 (21 Mar 2017 08:08)  167 (20 Mar 2017 21:34)    I & Os for 24h ending  @ 07:00  =============================================  IN: 200 ml / OUT: 0 ml / NET: 200 ml    I & Os for current day (as of 03-21 @ 15:50)  =============================================  IN: 0 ml / OUT: 575 ml / NET: -575 ml      Physical Exam:    Daily     Daily   General:  Well appearing, NAD,    HEENT:  Nonicteric, PERRLA, neck supple, tonsilar erythema, no edema or exudate, no facial tenderness, neck w/ full ROM  CV:  [id number] , RRR,  no JVD  Lungs:  CTA B/L, no wheezes, rales, rhonchi  Abdomen:  positive BS, Soft, non-tender, no distended, no hepatosplenomegaly  Extremities:  2+ DP/radial pulses b/l, no cyanosis, no edema  Back: no cva or midline tenderness  Skin:  Warm and dry, no rashes  :  No reyes  Neuro:  AAOx3,  CN II-XII grossly intact, 5/5 str all 4 ext, sensation intact, (-) dysmetria b/l (-) dysdiadochokinesia bl  No Restraints    LABS:                        14.4   26.4  )-----------( 175      ( 21 Mar 2017 06:39 )             40.8     21 Mar 2017 06:39    137    |  106    |  13     ----------------------------<  127    3.7     |  23     |  0.82     Ca    8.3        21 Mar 2017 06:39    TPro  7.0    /  Alb  3.2    /  TBili  1.1    /  DBili  0.18   /  AST  52     /  ALT  84     /  AlkPhos  67     21 Mar 2017 06:39      Urinalysis Basic - ( 20 Mar 2017 19:46 )    Color: Yellow / Appearance: Clear / S.010 / pH: x  Gluc: x / Ketone: 15 mg/dL  / Bili: NEGATIVE / Urobili: 1.0 E.U./dL   Blood: x / Protein: Trace mg/dL / Nitrite: NEGATIVE   Leuk Esterase: NEGATIVE / RBC: 5-10 /HPF / WBC < 5 /HPF   Sq Epi: x / Non Sq Epi: Rare /HPF / Bacteria: Present /HPF          RADIOLOGY & ADDITIONAL TESTS:

## 2017-03-21 NOTE — PROGRESS NOTE ADULT - PROBLEM SELECTOR PLAN 4
resolved  suspect due to dehydration and acute febrile illness  non focal neuro exam  consider neuro eval as outpt

## 2017-03-21 NOTE — DISCHARGE NOTE ADULT - PLAN OF CARE
treat Continue your Valsartan daily as before. Take Amoxacillin 775mg Daily for 10 days.  Follow up with Dr. Hardy.  You have an appointment on April 7th, however they will call you to get you in sooner. Continue your Metformin daily. You were diagnosed with a virus.  Treatment with hydration, cough drops and tylenol for pain will help with you symptoms and the virus will clear on its own. Continue your Metformin daily as before. Take Augmenting twice per day for 10 days.  Follow up with Dr. Hardy.  You have an appointment on April 7th, however they will call you to get you in sooner. Take Augmentin twice per day for 10 days.  Follow up with Dr. Hardy.  You have an appointment on April 7th, however they will call you to get you in sooner.

## 2017-03-21 NOTE — DISCHARGE NOTE ADULT - MEDICATION SUMMARY - MEDICATIONS TO TAKE
I will START or STAY ON the medications listed below when I get home from the hospital:    valsartan 40 mg oral tablet  -- 1 tab(s) by mouth 2 times a day  -- Indication: For Hypertension    Lexapro 10 mg oral tablet  -- 1 tab(s) by mouth once a day  -- Indication: For Depression    metFORMIN 500 mg oral tablet  -- 1 tab(s) by mouth 2 times a day  -- Indication: For Diabetes    amoxicillin 775 mg oral tablet, extended release  -- 1 tab(s) by mouth once a day  -- Do not chew, break, or crush.  Finish all this medication unless otherwise directed by prescriber.  Take with food.    -- Indication: For Strep throat    Wellbutrin  mg/24 hours oral tablet, extended release  -- 1 tab(s) by mouth every 24 hours  -- Indication: For Depression I will START or STAY ON the medications listed below when I get home from the hospital:    valsartan 40 mg oral tablet  -- 1 tab(s) by mouth 2 times a day  -- Indication: For Hypertension    Lexapro 10 mg oral tablet  -- 1 tab(s) by mouth once a day  -- Indication: For Depression    metFORMIN 500 mg oral tablet  -- 1 tab(s) by mouth 2 times a day  -- Indication: For Diabetes    Augmentin 875 mg-125 mg oral tablet  -- 875 milligram(s) by mouth every 12 hours  -- Finish all this medication unless otherwise directed by prescriber.  Take with food or milk.    -- Indication: For Pharyngitis and sinusitis     Wellbutrin  mg/24 hours oral tablet, extended release  -- 1 tab(s) by mouth every 24 hours  -- Indication: For Depression

## 2017-03-21 NOTE — DISCHARGE NOTE ADULT - CARE PLAN
Principal Discharge DX:	Acute pharyngitis, unspecified etiology  Goal:	treat  Instructions for follow-up, activity and diet:	Take Amoxacillin 775mg Daily for 10 days.  Follow up with Dr. Hardy.  You have an appointment on April 7th, however they will call you to get you in sooner.  Secondary Diagnosis:	HTN (hypertension)  Instructions for follow-up, activity and diet:	Continue your Valsartan daily as before.  Secondary Diagnosis:	Diabetes  Instructions for follow-up, activity and diet:	Continue your Metformin daily.  Secondary Diagnosis:	Human metapneumovirus as the cause of diseases classified elsewhere  Instructions for follow-up, activity and diet:	You were diagnosed with a virus.  Treatment with hydration, cough drops and tylenol for pain will help with you symptoms and the virus will clear on its own. Principal Discharge DX:	Acute pharyngitis, unspecified etiology  Goal:	treat  Instructions for follow-up, activity and diet:	Take Amoxacillin 775mg Daily for 10 days.  Follow up with Dr. Hardy.  You have an appointment on April 7th, however they will call you to get you in sooner.  Secondary Diagnosis:	HTN (hypertension)  Instructions for follow-up, activity and diet:	Continue your Valsartan daily as before.  Secondary Diagnosis:	Diabetes  Instructions for follow-up, activity and diet:	Continue your Metformin daily as before.  Secondary Diagnosis:	Human metapneumovirus as the cause of diseases classified elsewhere  Instructions for follow-up, activity and diet:	You were diagnosed with a virus.  Treatment with hydration, cough drops and tylenol for pain will help with you symptoms and the virus will clear on its own. Principal Discharge DX:	Acute pharyngitis, unspecified etiology  Goal:	treat  Instructions for follow-up, activity and diet:	Take Augmenting twice per day for 10 days.  Follow up with Dr. Hardy.  You have an appointment on April 7th, however they will call you to get you in sooner.  Secondary Diagnosis:	HTN (hypertension)  Instructions for follow-up, activity and diet:	Continue your Valsartan daily as before.  Secondary Diagnosis:	Diabetes  Instructions for follow-up, activity and diet:	Continue your Metformin daily as before.  Secondary Diagnosis:	Human metapneumovirus as the cause of diseases classified elsewhere  Instructions for follow-up, activity and diet:	You were diagnosed with a virus.  Treatment with hydration, cough drops and tylenol for pain will help with you symptoms and the virus will clear on its own. Principal Discharge DX:	Acute pharyngitis, unspecified etiology  Goal:	treat  Instructions for follow-up, activity and diet:	Take Augmentin twice per day for 10 days.  Follow up with Dr. Hardy.  You have an appointment on April 7th, however they will call you to get you in sooner.  Secondary Diagnosis:	HTN (hypertension)  Instructions for follow-up, activity and diet:	Continue your Valsartan daily as before.  Secondary Diagnosis:	Diabetes  Instructions for follow-up, activity and diet:	Continue your Metformin daily as before.  Secondary Diagnosis:	Human metapneumovirus as the cause of diseases classified elsewhere  Instructions for follow-up, activity and diet:	You were diagnosed with a virus.  Treatment with hydration, cough drops and tylenol for pain will help with you symptoms and the virus will clear on its own.

## 2017-03-21 NOTE — DISCHARGE NOTE ADULT - CARE PROVIDER_API CALL
Troy Hardy), Internal Medicine  [address] [place] [address]  Phone: [phone]  Fax: [phone]

## 2017-03-21 NOTE — PROGRESS NOTE ADULT - PROBLEM SELECTOR PLAN 1
pharyngitis much improved  now afebrile  leukocytosis likely due to infection + dexamethasone 10 iv given yesterday  on unasyn

## 2017-03-21 NOTE — PROGRESS NOTE ADULT - ASSESSMENT
37 yo female with DM2, HTN, ANAM on CPAP at night, depression, who presented with 1 week of sore throat and 1 day of fever, likely 2/2 to pharyngitis bacterial vs viral

## 2017-03-23 DIAGNOSIS — G47.33 OBSTRUCTIVE SLEEP APNEA (ADULT) (PEDIATRIC): ICD-10-CM

## 2017-03-23 DIAGNOSIS — R50.9 FEVER, UNSPECIFIED: ICD-10-CM

## 2017-03-23 DIAGNOSIS — B97.81 HUMAN METAPNEUMOVIRUS AS THE CAUSE OF DISEASES CLASSIFIED ELSEWHERE: ICD-10-CM

## 2017-03-23 DIAGNOSIS — I10 ESSENTIAL (PRIMARY) HYPERTENSION: ICD-10-CM

## 2017-03-23 DIAGNOSIS — A41.9 SEPSIS, UNSPECIFIED ORGANISM: ICD-10-CM

## 2017-03-23 DIAGNOSIS — Z99.81 DEPENDENCE ON SUPPLEMENTAL OXYGEN: ICD-10-CM

## 2017-03-23 DIAGNOSIS — E11.9 TYPE 2 DIABETES MELLITUS WITHOUT COMPLICATIONS: ICD-10-CM

## 2017-03-23 DIAGNOSIS — F32.9 MAJOR DEPRESSIVE DISORDER, SINGLE EPISODE, UNSPECIFIED: ICD-10-CM

## 2017-03-23 DIAGNOSIS — R09.02 HYPOXEMIA: ICD-10-CM

## 2017-03-23 DIAGNOSIS — Z79.84 LONG TERM (CURRENT) USE OF ORAL HYPOGLYCEMIC DRUGS: ICD-10-CM

## 2017-03-23 DIAGNOSIS — B95.0 STREPTOCOCCUS, GROUP A, AS THE CAUSE OF DISEASES CLASSIFIED ELSEWHERE: ICD-10-CM

## 2017-03-23 DIAGNOSIS — J02.9 ACUTE PHARYNGITIS, UNSPECIFIED: ICD-10-CM

## 2017-03-23 DIAGNOSIS — J01.90 ACUTE SINUSITIS, UNSPECIFIED: ICD-10-CM

## 2017-03-25 LAB
CULTURE RESULTS: SIGNIFICANT CHANGE UP
CULTURE RESULTS: SIGNIFICANT CHANGE UP
SPECIMEN SOURCE: SIGNIFICANT CHANGE UP
SPECIMEN SOURCE: SIGNIFICANT CHANGE UP

## 2017-08-18 NOTE — ED ADULT NURSE NOTE - OBJECTIVE STATEMENT
Nery GUZMAN
Patient presents to ED with lethargy, weakness and malaise following diagnosis of sinusitis last week on friday. As per patient, "I was having some congestion and went to the Urgent Care center where they diagnosed me with sinusitis, but never gave me any antibiotics. Breath sounds clear, however white patch exudate noted on posterior larynx. PERRLA, patient is alert and orientedx3.

## 2018-11-12 NOTE — ED PROVIDER NOTE - MEDICAL DECISION MAKING DETAILS
present 36M with dm, htn presenting with hiccups for 30 hours. Vitals notable for mild hypertension, neuro exam unremarkable. Doubt intracranial pathology. Can consider diaphragmatic irritation. Mild elevation in wbc count, plan for CT scan of abd/pelvis with IV contrast.

## 2021-02-25 NOTE — DISCHARGE NOTE ADULT - PATIENT PORTAL LINK FT
Medicare Wellness Visit  Plan for Preventive Care    A good way for you to stay healthy is to use preventive care.  Medicare covers many services that can help you stay healthy.* The goal of these services is to find any health problems as quickly as possible. Finding problems early can help make them easier to treat.  Your personal plan below lists the services you may need and when they are due.     Health Maintenance Summary     Shingles Vaccine (1 of 2)  Overdue since 7/26/1994    Medicare Wellness Visit (Yearly)  Overdue since 4/4/2020    Diabetes Foot Exam (Yearly)  Overdue since 10/7/2020    Diabetes A1C (Every 3 Months)  Next due on 5/23/2021    Diabetes Eye Exam (Yearly)  Next due on 7/15/2021    DM/CKD Microalbumin (Yearly)  Next due on 11/3/2021    DM/CKD GFR (Yearly)  Next due on 12/10/2021    Depression Screening (Yearly)  Next due on 2/25/2022    DTaP/Tdap/Td Vaccine (2 - Td)  Next due on 3/9/2025    Pneumococcal Vaccine 65+   Completed    Influenza Vaccine   Completed    Meningococcal Vaccine   Aged Out    HPV Vaccine   Aged Out           Preventive Care for  Men    Heart Screenings (Cardiovascular):  · Blood tests are used to check your cholesterol, lipid and triglyceride levels. High levels can increase your risk for heart disease and stroke. High levels can be treated with medications, diet and exercise. Lowering your levels can help keep your heart and blood vessels healthy.  Your provider will order these tests if they are needed.    · An ultrasound is done to see if you have an abdominal aortic aneurysm (AAA).  This is an enlargement of one of the main blood vessels that delivers blood to the body.   In the [place], 9,000 deaths are caused by AAA.  You may not even know you have this problem and as many as 1 in 3 people will have a serious problem if it is not treated.  Early diagnosis allows for more effective treatment and cure.  If you have a family history of AAA or are a male age  65-75 who has smoked, you are at higher risk of an AAA.  Your provider can order this test, if needed.    Colorectal Screening:  · There are many tests that are used to check for cancer of your colon and rectum. You and your provider should discuss what test is best for you and when to have it done.  Options include:  · Screening Colonoscopy: exam of the entire colon, seen through a flexible lighted tube.  · Flexible Sigmoidoscopy: exam of the last third (sigmoid portion) of the colon and rectum, seen through a flexible lighted tube.  · Cologuard DNA stool test: a sample of your stool is used to screen for cancer and unseen blood in your stool.  · Fecal Occult Blood Test: a sample of your stool is studied to find any unseen blood    Flu Shot:  · An immunization that helps to prevent influenza (the flu). You should get this every year. The best time to get the shot is in the fall.    Pneumococcal Shot:  • Vaccines are available that can help prevent pneumococcal disease, which is any type of infection caused by Streptococcus pneumoniae bacteria.   Their use can prevent some cases of pneumonia, meningitis, and sepsis. There are two types of pneumococcal vaccines:   o Conjugate vaccines (PCV-13 or Prevnar 13®) - helps protect against the 13 types of pneumococcal bacteria that are the most common causes of serious infections in children and adults.    o Polysaccharide vaccine (PPSV23 or Osuiaojlk06®) - helps protect against 23 types of pneumococcal bacteria for patients who are recommended to get it.  These vaccines should be given at least 12 months apart.  A booster is usually not needed.     Hepatitis B Shot:  · An immunization that helps to protect people from getting Hepatitis B. Hepatitis B is a virus that spreads through contact with infected blood or body fluids. Many people with the virus do not have symptoms.  The virus can lead to serious problems, such as liver disease. Some people are at higher risk than  others. Your doctor will tell you if you need this shot.     Diabetes Screening:  · A test to measure sugar (glucose) in your blood is called a fasting blood sugar. Fasting means you cannot have food or drink for at least 8 hours before the test. This test can detect diabetes long before you may notice symptoms.    Glaucoma Screening:  · Glaucoma screening is performed by your eye doctor. The test measures the fluid pressure inside your eyes to determine if you have glaucoma.     Hepatitis C Screening:  · A blood test to see if you have the hepatitis C virus.  Hepatitis C attacks the liver and is a major cause of chronic liver disease.  Medicare will cover a single screening for all adults born between 1945 & 1965, or high risk patients (people who have injected illegal drugs or people who have had blood transfusions).  High risk patients who continue to inject illegal drugs can be screened for Hepatitis C every year.    Smoking and Tobacco-Use Cessation Counseling:  · Tobacco is the single greatest cause of disease and early death in our country today. Medication and counseling together can increase a person’s chance of quitting for good.   · Medicare covers two quitting attempts per year, with four counseling sessions per attempt (eight sessions in a 12 month period)        Preventive Screening tests for Men    Prostate Screening:  · Should you have a prostate cancer test (PSA)?  It is up to you to decide if you want a prostate cancer test. Talk to your clinician to find out if the test is right for you.  Things for you to consider and talk about should include:  · Benefits and harms of the test  · Your family history  · How your race/ethnicity may influence the test  · If the test may impact other medical conditions you have  · Your values on screenings and treatments    *Medicare pays for many preventive services to keep you healthy. For some of these services, you might have to pay a deductible, coinsurance,  and / or copayment.  The amounts vary depending on the type of services you need and the kind of Medicare health plan you have.               “You can access the Follow[facility] Patient Portal, offered by [facility], by registering with the following website: [email or web address]”

## 2022-06-13 PROBLEM — E11.9 TYPE 2 DIABETES MELLITUS WITHOUT COMPLICATIONS: Chronic | Status: ACTIVE | Noted: 2017-03-19

## 2022-06-13 PROBLEM — I10 ESSENTIAL (PRIMARY) HYPERTENSION: Chronic | Status: ACTIVE | Noted: 2017-03-19

## 2022-06-14 PROBLEM — Z00.00 ENCOUNTER FOR PREVENTIVE HEALTH EXAMINATION: Status: ACTIVE | Noted: 2022-06-14

## 2022-07-27 ENCOUNTER — APPOINTMENT (OUTPATIENT)
Dept: ENDOCRINOLOGY | Facility: CLINIC | Age: 42
End: 2022-07-27

## 2022-07-27 VITALS
HEIGHT: 72 IN | DIASTOLIC BLOOD PRESSURE: 87 MMHG | SYSTOLIC BLOOD PRESSURE: 131 MMHG | HEART RATE: 62 BPM | BODY MASS INDEX: 30.48 KG/M2 | WEIGHT: 225 LBS

## 2022-07-27 LAB — GLUCOSE BLDC GLUCOMTR-MCNC: 104

## 2022-07-27 PROCEDURE — 82962 GLUCOSE BLOOD TEST: CPT

## 2022-07-27 PROCEDURE — 99205 OFFICE O/P NEW HI 60 MIN: CPT | Mod: 25

## 2022-07-29 NOTE — END OF VISIT
[FreeTextEntry3] : All medical record entries made by the Scribe were at my, Dr. Vish Thakkar, direction and personally dictated by me on 07/27/2022. I have reviewed the chart and agree that the record accurately reflects my personal performance of the history, physical exam, assessment and plan. I have also personally directed, reviewed and agreed with the chart.  [Time Spent: ___ minutes] : I have spent [unfilled] minutes of time on the encounter.

## 2022-07-29 NOTE — ASSESSMENT
[Carbohydrate Consistent Diet] : carbohydrate consistent diet [Importance of Diet and Exercise] : importance of diet and exercise to improve glycemic control, achieve weight loss and improve cardiovascular health [Exercise/Effect on Glucose] : exercise/effect on glucose [Self Monitoring of Blood Glucose] : self monitoring of blood glucose [FreeTextEntry1] : 41 year old M pt with:\par \par 1. T2DM diagnosed in 2004:\par POCT A1c 7.7% today. \par General concepts on diabetes mellitus explained including diabetes treatment goals: Glucose (A1c), LDL, blood pressure and body weight target goals.\par Discussed importance of active lifestyle, diet modification and monitoring glucose levels (pre and postprandial).\par Briefly summarized on how oral and injectables diabetic medications works, insulin kinetics, and potential side effects.\par Continue present DM treatment: Metformin and discontinue Repaglinide 0.5 mg. Prescribed Trulicity 0.75 mg qw today, which is to be increased to 1.5 mg in a month. \par Refer pt to see RD and nurse educator in September 2022 for comprehensive DM teachings. \par Order labs today, including metabolic and lipid profiles. Will contact pt with results. \par \par Return in: October 2022.

## 2022-07-29 NOTE — HISTORY OF PRESENT ILLNESS
[Continuous Glucose Monitoring] : Continuous Glucose Monitoring: Yes [FreeTextEntry1] : 41 year old M pt, with Hx of T2DM diagnosed in 2004, referred by DEMI Mackay, presents today to establish endocrine care.  \par Patient with no information of DM related complications\par Home glucose test: CGM (started 06/2022). \par Last Funduscopic visit: 07/2022\par Other PMHx: Essential HTN, HLD, Obesity, Sleep apnea, fatty liver disease, Depression.\par FHx: Mother: HTN, mental illness. Father: HTN. Sibling: mental illness. No family Hx of: DM, CAD\par Social Hx: Never smoker. No EtOH use. No recreational drug use. Works 5x7. \par Lifestyle: Active- 1 hr a day 5-7x7. Meals: Eats 3 meals a day - cut down carbs. \par Vaccine: COVID (fully)\par NKDA\par Dental visit: up to date 07/27/2022 \par \par 07/27/2022\par Patient presents today for T2DM, diagnosed in 2004. Looking back on it, pt was sick when he was diagnosed. initially, pt was placed on insulin for 2-3 months, before being transitioned into Metformin 500 mg bid. \par \par Patient has POCT A1c 7.7%, POCT 104, /87 and BMI 30.52. He is on CGM. Pt modified his lifestyle and diet (cut down carbs). FBS average 150. PPBS range . Denies hypoglycemias. Pt saw ophthalmologist last week; unremarkable. \par  \par Current Medications: Metformin 850 mg (increased in winter 2021), Repaglinide 0.5 mg, Atorvastatin 20 mg, Losartan 50 mg, HCTZ 25 mg, Amlodipine 5 mg, Vit D3 25 mcg (1000 UT) 25 mcg, Buproprion 300 mg, Lexapro 10 mg, Flonase, Omega-3 (lovaza) 1 g 2 capsules bid, Cyanocobalamin 1000 mcg\par \par Medication modified/added this visit: Trulicity 0.75 mg qw (rx 07/27/2022), Hold Repaglinide 0.5 mg (DCed 07/27/2022) [Hypoglycemia] : Patient is not hypoglycemic.

## 2022-07-29 NOTE — THERAPY
[Today's Date] : [unfilled] [FreeTextEntry7] : Metformin 850 mg, Trulicity 0.75 mg qw, Atorvastatin 20 mg

## 2022-07-29 NOTE — REASON FOR VISIT
[Initial Evaluation] : an initial evaluation [DM Type 2] : DM Type 2 [FreeTextEntry2] : NP Rianna Thompson

## 2022-07-29 NOTE — ADDENDUM
[FreeTextEntry1] : I Ruth Martín act soley as a scribe for Dr. Vish Thakkar on this date. 07/27/2022

## 2022-07-29 NOTE — PHYSICAL EXAM
[Alert] : alert [Well Nourished] : well nourished [No Acute Distress] : no acute distress [Well Developed] : well developed [Normal Sclera/Conjunctiva] : normal sclera/conjunctiva [EOMI] : extra ocular movement intact [No Proptosis] : no proptosis [Normal Oropharynx] : the oropharynx was normal [No Respiratory Distress] : no respiratory distress [No Accessory Muscle Use] : no accessory muscle use [Clear to Auscultation] : lungs were clear to auscultation bilaterally [Normal S1, S2] : normal S1 and S2 [Normal Rate] : heart rate was normal [Regular Rhythm] : with a regular rhythm [No Edema] : no peripheral edema [Pedal Pulses Normal] : the pedal pulses are present [Normal Bowel Sounds] : normal bowel sounds [Not Tender] : non-tender [Not Distended] : not distended [Soft] : abdomen soft [No Spinal Tenderness] : no spinal tenderness [Spine Straight] : spine straight [No Stigmata of Cushings Syndrome] : no stigmata of Cushings Syndrome [Normal Gait] : normal gait [Normal Strength/Tone] : muscle strength and tone were normal [No Rash] : no rash [Normal Reflexes] : deep tendon reflexes were 2+ and symmetric [No Tremors] : no tremors [Oriented x3] : oriented to person, place, and time [Acanthosis Nigricans] : no acanthosis nigricans [de-identified] : Nodularities in the L lobe.

## 2022-08-10 LAB — HBA1C MFR BLD HPLC: 7.7

## 2022-09-21 ENCOUNTER — APPOINTMENT (OUTPATIENT)
Dept: ENDOCRINOLOGY | Facility: CLINIC | Age: 42
End: 2022-09-21

## 2022-09-21 VITALS
BODY MASS INDEX: 30.65 KG/M2 | DIASTOLIC BLOOD PRESSURE: 75 MMHG | SYSTOLIC BLOOD PRESSURE: 120 MMHG | HEART RATE: 76 BPM | WEIGHT: 226 LBS

## 2022-09-21 LAB
GLUCOSE BLDC GLUCOMTR-MCNC: 120
HBA1C MFR BLD HPLC: 6.5

## 2022-09-21 PROCEDURE — 82962 GLUCOSE BLOOD TEST: CPT

## 2022-09-21 PROCEDURE — 83036 HEMOGLOBIN GLYCOSYLATED A1C: CPT | Mod: QW

## 2022-09-21 PROCEDURE — 99072 ADDL SUPL MATRL&STAF TM PHE: CPT

## 2022-09-21 PROCEDURE — 99214 OFFICE O/P EST MOD 30 MIN: CPT | Mod: 25

## 2022-09-21 NOTE — ASSESSMENT
[FreeTextEntry1] : Diabetes - control improved since last visit with Dr. Thakkar and is at goal now.\par Continue the same Tx.\par DSME provided - pt is a quick learner - verbalized understanding;\par Questions answered.\par RD appt - declined at this time.\par F/U with Dr. Thakkar in 2 months.

## 2022-09-21 NOTE — HISTORY OF PRESENT ILLNESS
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 Thakkar ~ 2 months ago and came here for DSME.\par Currently treated with metformin and Trulicity 1.5 (had GI SE for ~ 2 weeks, but tolerates it OK now).\par States Diet improved since he started Trulicity.\par Exerciss 5-6 times a week.\par Ophtho, podiatry and dental - UTD.\par POC BG - 120 mg/dl\par POC A1C - 6.5% (was 7.7% at the last visit).\par Feels well.

## 2022-09-21 NOTE — PHYSICAL EXAM
[Alert] : alert [Well Nourished] : well nourished [Healthy Appearance] : healthy appearance [No Acute Distress] : no acute distress [Well Developed] : well developed [Normal Voice/Communication] : normal voice communication [Normal Sclera/Conjunctiva] : normal sclera/conjunctiva [No Proptosis] : no proptosis [No Lid Lag] : no lid lag [No Respiratory Distress] : no respiratory distress [No Accessory Muscle Use] : no accessory muscle use [Normal Rate and Effort] : normal respiratory rate and effort [Normal Rate] : heart rate was normal [Spine Straight] : spine straight [No Stigmata of Cushings Syndrome] : no stigmata of Cushings Syndrome [Normal Gait] : normal gait [No Clubbing, Cyanosis] : no clubbing  or cyanosis of the fingernails [No Involuntary Movements] : no involuntary movements were seen [No Joint Swelling] : no joint swelling seen [Oriented x3] : oriented to person, place, and time

## 2022-09-28 ENCOUNTER — APPOINTMENT (OUTPATIENT)
Dept: PULMONOLOGY | Facility: CLINIC | Age: 42
End: 2022-09-28

## 2022-09-28 VITALS
HEART RATE: 71 BPM | TEMPERATURE: 97.9 F | BODY MASS INDEX: 31.15 KG/M2 | DIASTOLIC BLOOD PRESSURE: 75 MMHG | WEIGHT: 230 LBS | SYSTOLIC BLOOD PRESSURE: 114 MMHG | HEIGHT: 72 IN | OXYGEN SATURATION: 96 %

## 2022-09-28 DIAGNOSIS — G47.30 SLEEP APNEA, UNSPECIFIED: ICD-10-CM

## 2022-09-28 PROCEDURE — 99204 OFFICE O/P NEW MOD 45 MIN: CPT

## 2022-09-28 PROCEDURE — 99072 ADDL SUPL MATRL&STAF TM PHE: CPT

## 2022-09-28 NOTE — ASSESSMENT
[FreeTextEntry1] : 42 y/o M with known ANAM on CPAP who is here for initial visit. His machine is over 7 years old and has  started to malfunction. Will re-evaluate obstructive sleep apnea with unattended home sleep testing, asked to stop using CPAP for two nights prior to study, will then order new equipment if test positive.

## 2022-09-28 NOTE — REVIEW OF SYSTEMS
[Fatigue] : fatigue [Obesity] : obesity [Diabetes] : diabetes  [Negative] : Psychiatric [FreeTextEntry9] : HTN

## 2022-09-28 NOTE — PHYSICAL EXAM
[General Appearance - In No Acute Distress] : no acute distress [Enlarged Base of the Tongue] : enlargement of the base of the tongue [II] : II [Neck Appearance] : the appearance of the neck was normal [Apical Impulse] : the apical impulse was normal [Heart Sounds] : normal S1 and S2 [Exaggerated Use Of Accessory Muscles For Inspiration] : no accessory muscle use [Abnormal Walk] : normal gait [Involuntary Movements] : no involuntary movements were seen [No Focal Deficits] : no focal deficits [Oriented To Time, Place, And Person] : oriented to person, place, and time [Affect] : the affect was normal [Nail Clubbing] : no clubbing of the fingernails [Cyanosis, Localized] : no localized cyanosis [Petechial Hemorrhages (___cm)] : no petechial hemorrhages [] : no ischemic changes

## 2022-09-28 NOTE — HISTORY OF PRESENT ILLNESS
[FreeTextEntry1] : 09/28/2022 :  MAURISIO PATEL is a 41 year old never smoker,  male with PMHx T2 DM, HTN, HLD, depression, known ANAM on CPAP who is here for initial visit. \par \par \par He was dx'd with ANAM 7 years ago and was told "stopped breathing over 32 x (per hour?)". His last sleep study was 7 years ago and his CPAP is over 7 years old. He has been using his machine every night and he feels great with it, although now he is getting error messages and the machine does not send any data to him. He does not have a durable medical equipment provider   since he moved from [place] and did not bother to switch his record. He has been using resmed p10 mask and buys his supplies every 4 months. \par \par \par Sleep Routine:\par \par He goes to bed at 10P, sleep latency is about 5 min, he wakes up once, WASO is varies , and then he is up at 8A. He naps everyday x 1 hour . [place] sleepiness scale (out of 24 points) = 5\par \par He denies cataplexy, RLS, parasomnia, or any other sleep behavioral issues.

## 2022-10-10 ENCOUNTER — APPOINTMENT (OUTPATIENT)
Dept: SLEEP CENTER | Facility: HOME HEALTH | Age: 42
End: 2022-10-10

## 2022-10-10 ENCOUNTER — OUTPATIENT (OUTPATIENT)
Dept: OUTPATIENT SERVICES | Facility: HOSPITAL | Age: 42
LOS: 1 days | End: 2022-10-10
Payer: MEDICARE

## 2022-10-10 PROCEDURE — 95800 SLP STDY UNATTENDED: CPT

## 2022-10-10 PROCEDURE — 95800 SLP STDY UNATTENDED: CPT | Mod: 26

## 2022-10-12 DIAGNOSIS — G47.33 OBSTRUCTIVE SLEEP APNEA (ADULT) (PEDIATRIC): ICD-10-CM

## 2022-10-19 ENCOUNTER — NON-APPOINTMENT (OUTPATIENT)
Age: 42
End: 2022-10-19

## 2022-10-26 ENCOUNTER — TRANSCRIPTION ENCOUNTER (OUTPATIENT)
Age: 42
End: 2022-10-26

## 2022-11-08 ENCOUNTER — APPOINTMENT (OUTPATIENT)
Dept: ENDOCRINOLOGY | Facility: CLINIC | Age: 42
End: 2022-11-08

## 2022-11-08 VITALS
DIASTOLIC BLOOD PRESSURE: 77 MMHG | SYSTOLIC BLOOD PRESSURE: 125 MMHG | BODY MASS INDEX: 31.06 KG/M2 | WEIGHT: 229 LBS | HEART RATE: 75 BPM

## 2022-11-08 PROCEDURE — 99214 OFFICE O/P EST MOD 30 MIN: CPT | Mod: 25

## 2022-11-08 PROCEDURE — 82962 GLUCOSE BLOOD TEST: CPT

## 2022-11-11 NOTE — END OF VISIT
[FreeTextEntry3] : All medical record entries made by the Scribe were at my, Dr. Vish Thakkar, direction and personally dictated by me on 11/08/2022. I have reviewed the chart and agree that the record accurately reflects my personal performance of the history, physical exam, assessment and plan. I have also personally, directed, reviewed and agreed with the chart  [Time Spent: ___ minutes] : I have spent [unfilled] minutes of time on the encounter.

## 2022-11-11 NOTE — DATA REVIEWED
[FreeTextEntry1] : Labs:   \par - 09/21/22: POCT A1c 6.5% (H)  \par - 08/02/22: A1c 7.6% (H), TSH 1.85\par - 08/02/22: ([facility]): LDL-c 77, HDL 39, ACR 13\par \par

## 2022-11-11 NOTE — PHYSICAL EXAM
[Alert] : alert [Well Nourished] : well nourished [No Acute Distress] : no acute distress [Well Developed] : well developed [Normal Sclera/Conjunctiva] : normal sclera/conjunctiva [EOMI] : extra ocular movement intact [No Proptosis] : no proptosis [Normal Oropharynx] : the oropharynx was normal [No Respiratory Distress] : no respiratory distress [No Accessory Muscle Use] : no accessory muscle use [Clear to Auscultation] : lungs were clear to auscultation bilaterally [Normal S1, S2] : normal S1 and S2 [Normal Rate] : heart rate was normal [Regular Rhythm] : with a regular rhythm [No Edema] : no peripheral edema [Pedal Pulses Normal] : the pedal pulses are present [Normal Bowel Sounds] : normal bowel sounds [Not Tender] : non-tender [Not Distended] : not distended [Soft] : abdomen soft [No Spinal Tenderness] : no spinal tenderness [Spine Straight] : spine straight [No Stigmata of Cushings Syndrome] : no stigmata of Cushings Syndrome [Normal Gait] : normal gait [Normal Strength/Tone] : muscle strength and tone were normal [No Rash] : no rash [Normal Reflexes] : deep tendon reflexes were 2+ and symmetric [No Tremors] : no tremors [Oriented x3] : oriented to person, place, and time [Acanthosis Nigricans] : no acanthosis nigricans [de-identified] : Nodularities in the L lobe.

## 2022-11-11 NOTE — HISTORY OF PRESENT ILLNESS
[Continuous Glucose Monitoring] : Continuous Glucose Monitoring: Yes [FreeTextEntry1] : 41 year old M pt, with Hx of T2DM diagnosed in 2004, referred by DEMI Mackay, presents today to establish endocrine care.  \par Patient with no information of DM related complications\par Home glucose test: CGM (started 06/2022). \par Last Funduscopic visit: 07/2022\par Other PMHx: Essential HTN, HLD, Obesity, Sleep apnea, fatty liver disease, Depression.\par FHx: Mother: HTN, mental illness. Father: HTN. Sibling: mental illness. No family Hx of: DM, CAD \par Social Hx: Never smoker. No EtOH use. No recreational drug use. Works 5x7. \par Lifestyle: Active- 1 hr a day 5-7x7. Meals: Eats 3 meals a day - cut down carbs. \par Vaccine: COVID (fully)\par NKDA\par Dental visit: up to date 07/27/2022 \par \par 07/27/2022\par Patient presents today for T2DM, diagnosed in 2004. Looking back on it, pt was sick when he was diagnosed. initially, pt was placed on insulin for 2-3 months, before being transitioned into Metformin 500 mg bid. \par \par Patient has POCT A1c 7.7%, POCT 104, /87 and BMI 30.52. He is on CGM. Pt modified his lifestyle and diet (cut down carbs). FBS average 150. PPBS range . Denies hypoglycemias. Pt saw ophthalmologist last week; unremarkable. \par \par 11/08/2022\par Pt presents today for DM f/u with POCT 155, /77 and BMI  31.06, feeling well with no physical complaints. He saw ophthalmologist last yr: No new diagnosis. He was dx with HTN 5-6 yrs and has been taking atorvastatin for over 10 yrs. He has never seen cardiologist. He states he has an active lifestyle and uses glucose monitor to checks BS and normally sees below 200 for  PPBS. Denies pins and needle sensation. \par \par [DM Medications verified on 11/08/2022 as per pt] \par Current Medications: Trulicity 3 mg, Metformin 850 mg (increased in winter 2021), Atorvastatin 20 mg, Losartan 50 mg, HCTZ 25 mg, Amlodipine 5 mg,  Lexapro 10 mg, Flonase, Omega-3 (lovaza) 1 g 2 capsules bid, Cyanocobalamin 1000 mcg\par \par Medication modified/added this visit: Trulicity 0.75 mg qw (rx 07/27/2022), Hold Repaglinide 0.5 mg (DCed 07/27/2022) \par Trulvity 1.5 more than 2 months,  [Hypoglycemia] : Patient is not hypoglycemic.

## 2022-11-11 NOTE — ADDENDUM
[FreeTextEntry1] : I, Farhana Pickett, acted solely as a scribe for Dr. Vish Thakkar on this date 11/08/2022

## 2022-11-16 ENCOUNTER — APPOINTMENT (OUTPATIENT)
Dept: PULMONOLOGY | Facility: CLINIC | Age: 42
End: 2022-11-16

## 2022-11-16 VITALS
BODY MASS INDEX: 31.15 KG/M2 | HEART RATE: 80 BPM | DIASTOLIC BLOOD PRESSURE: 84 MMHG | HEIGHT: 72 IN | WEIGHT: 230 LBS | SYSTOLIC BLOOD PRESSURE: 126 MMHG | OXYGEN SATURATION: 97 %

## 2022-11-18 NOTE — CONSULT NOTE ADULT - PROVIDER SPECIALTY LIST ADULT
Caller: PAVITHRA    Relationship:     Who are you requesting to speak with (clinical staff, provider,  specific staff member): DR TAVAREZ    Do you know the name of the person who called: PAVITHRA FROM [facility] AT [place]    What was the call regarding: PT WANTED TO CALL AND LET [facility] KNOW THEY WOULD SEE THE PATIENT ONCE A WEEK FOR 4 WEEKS    Do you require a callback: NO   MICU

## 2022-12-19 ENCOUNTER — NON-APPOINTMENT (OUTPATIENT)
Age: 42
End: 2022-12-19

## 2022-12-27 LAB — GLUCOSE BLDC GLUCOMTR-MCNC: 155

## 2023-03-19 NOTE — ASSESSMENT
[Importance of Diet and Exercise] : importance of diet and exercise to improve glycemic control, achieve weight loss and improve cardiovascular health [Carbohydrate Consistent Diet] : carbohydrate consistent diet [Exercise/Effect on Glucose] : exercise/effect on glucose [Self Monitoring of Blood Glucose] : self monitoring of blood glucose

## 2023-03-20 ENCOUNTER — APPOINTMENT (OUTPATIENT)
Dept: ENDOCRINOLOGY | Facility: CLINIC | Age: 43
End: 2023-03-20
Payer: COMMERCIAL

## 2023-03-20 VITALS
HEIGHT: 72 IN | SYSTOLIC BLOOD PRESSURE: 128 MMHG | BODY MASS INDEX: 31.42 KG/M2 | HEART RATE: 71 BPM | WEIGHT: 232 LBS | DIASTOLIC BLOOD PRESSURE: 77 MMHG

## 2023-03-20 PROCEDURE — 99214 OFFICE O/P EST MOD 30 MIN: CPT

## 2023-03-20 NOTE — DATA REVIEWED
[FreeTextEntry1] : Scanned Labs:     \par - 01/20/23; A1c 5.9%, ALT 57, s.creat 0.97 \par - 08/02/22: A1c 7.6% (H), TSH 1.85\par - 08/02/22: ([facility]): LDL-c 77, HDL 39, ACR 13\par \par

## 2023-03-20 NOTE — REVIEW OF SYSTEMS
[Negative] : Heme/Lymph [As Noted in HPI] : as noted in HPI [Recent Weight Gain (___ Lbs)] : recent weight gain: [unfilled] lbs [FreeTextEntry2] : He gained 3 lbs in 4 months

## 2023-03-20 NOTE — PHYSICAL EXAM
[Alert] : alert [Well Nourished] : well nourished [No Acute Distress] : no acute distress [Normal Sclera/Conjunctiva] : normal sclera/conjunctiva [Well Developed] : well developed [EOMI] : extra ocular movement intact [No Proptosis] : no proptosis [Normal Oropharynx] : the oropharynx was normal [No Respiratory Distress] : no respiratory distress [No Accessory Muscle Use] : no accessory muscle use [Clear to Auscultation] : lungs were clear to auscultation bilaterally [Normal S1, S2] : normal S1 and S2 [Normal Rate] : heart rate was normal [Regular Rhythm] : with a regular rhythm [No Edema] : no peripheral edema [Pedal Pulses Normal] : the pedal pulses are present [Normal Bowel Sounds] : normal bowel sounds [Not Tender] : non-tender [Not Distended] : not distended [Soft] : abdomen soft [No Spinal Tenderness] : no spinal tenderness [Spine Straight] : spine straight [No Stigmata of Cushings Syndrome] : no stigmata of Cushings Syndrome [Normal Strength/Tone] : muscle strength and tone were normal [Normal Gait] : normal gait [No Rash] : no rash [Normal Reflexes] : deep tendon reflexes were 2+ and symmetric [No Tremors] : no tremors [Oriented x3] : oriented to person, place, and time [Acanthosis Nigricans] : no acanthosis nigricans [de-identified] : Nodularities in the L lobe.

## 2023-03-20 NOTE — HISTORY OF PRESENT ILLNESS
[Continuous Glucose Monitoring] : Continuous Glucose Monitoring: Yes [FreeTextEntry1] : 41 year old M pt, with Hx of T2DM diagnosed in 2004, referred by DEMI Mackay, presents today to establish endocrine care.  \par Patient with no information of DM related complications\par No family Hx of: DM, CAD \par Home glucose test: CGM (started 06/2022). \par Lifestyle: Active- 1 hr a day 5-7x7. Meals: Eats 3 meals a day - cut down carbs. \par Last Funduscopic visit: 07/2022\par \par Other PMHx: Essential HTN, HLD, Obesity, Sleep apnea, fatty liver disease, Depression.\par FHx: Mother: HTN, mental illness. Father: HTN. Sibling: mental illness.\par Social Hx: Never smoker. No EtOH use. No recreational drug use. Works 5x7. \par Vaccine: COVID (fully)\par NKDA\par Dental visit: up to date 07/27/2022 \par \par \par 11/08/2022\par Pt presents today for DM f/u with POCT 155, /77 and BMI  31.06, feeling well with no physical complaints. He saw ophthalmologist last yr: No new diagnosis. He was dx with HTN 5-6 yrs and has been taking atorvastatin for over 10 yrs. He has never seen cardiologist. He states he has an active lifestyle and uses glucose monitor to checks BS and normally sees below 200 for  PPBS. Denies pins and needle sensation. \par \par 03/20/2023\par Pt has /77 and BMI 31.47. He gained 3 lbs in 4 months. \par Today, pt is feeling okay, with no physical complaints. \par Last ophthalmologist: Summer 2022\par Last PCP 01/2023\par Does not check BS frequently. \par  \par \par [Medications verified as per pt on 03/20/2023) \par Current Medications: Trulicity 1.5 mg (decreased from 3 mg ; GI disturbances), Metformin 850 mg, Atorvastatin 20 mg, Losartan 50 mg, HCTZ 25 mg, Amlodipine 5 mg,  Lexapro 10 mg, Flonase, Omega-3 (lovaza) 1 g 2 capsules bid, Cyanocobalamin 1000 mcg\par Held: Repaglinide 0.5 mg (DCed 07/27/2022) \par  [Hypoglycemia] : Patient is not hypoglycemic.

## 2023-03-20 NOTE — END OF VISIT
[FreeTextEntry3] : All medical record entries made by the Scribe were at my, Dr. Vish Thakkar, direction and personally dictated by me on 03/20/2023. I have reviewed the chart and agree that the record accurately reflects my personal performance of the history, physical exam, assessment and plan. I have also personally directed, reviewed and agreed with the chart.  [Time Spent: ___ minutes] : I have spent [unfilled] minutes of time on the encounter.

## 2023-03-20 NOTE — ADDENDUM
[FreeTextEntry1] : I, Yenifer Grande act soley as a scribe for Dr. Vish Thakkar on this date. 03/20/2023

## 2023-03-29 ENCOUNTER — APPOINTMENT (OUTPATIENT)
Dept: PULMONOLOGY | Facility: CLINIC | Age: 43
End: 2023-03-29
Payer: COMMERCIAL

## 2023-03-29 VITALS
HEART RATE: 78 BPM | DIASTOLIC BLOOD PRESSURE: 79 MMHG | HEIGHT: 72 IN | OXYGEN SATURATION: 97 % | SYSTOLIC BLOOD PRESSURE: 121 MMHG | BODY MASS INDEX: 31.29 KG/M2 | TEMPERATURE: 97.2 F | WEIGHT: 231 LBS

## 2023-03-29 PROCEDURE — 99213 OFFICE O/P EST LOW 20 MIN: CPT

## 2023-03-29 NOTE — PHYSICAL EXAM
[General Appearance - Well Developed] : well developed [Normal Appearance] : normal appearance [Well Groomed] : well groomed [General Appearance - Well Nourished] : well nourished [No Deformities] : no deformities [General Appearance - In No Acute Distress] : no acute distress [Normal Oropharynx] : normal oropharynx [II] : II [] : 2+

## 2023-03-29 NOTE — HISTORY OF PRESENT ILLNESS
[FreeTextEntry1] : 09/28/2022 :  MAURISIO PATEL is a 41 year old never smoker,  male with PMHx T2 DM, HTN, HLD, depression, known ANAM on CPAP who is here for initial visit. \par \par \par He was dx'd with ANAM 7 years ago and was told "stopped breathing over 32 x (per hour?)". His last sleep study was 7 years ago and his CPAP is over 7 years old. He has been using his machine every night and he feels great with it, although now he is getting error messages and the machine does not send any data to him. He does not have a durable medical equipment provider   since he moved from [place] and did not bother to switch his record. He has been using resmed p10 mask and buys his supplies every 4 months. \par \par \par Sleep Routine:\par \par He goes to bed at 10P, sleep latency is about 5 min, he wakes up once, WASO is varies , and then he is up at 8A. He naps everyday x 1 hour . [place] sleepiness scale (out of 24 points) = 5\par \par He denies cataplexy, RLS, parasomnia, or any other sleep behavioral issues.\par \par 3/29/23: After last seen had home sleep study done 10/10/2022, reviewed with patient: Sleep disordered breathing, apnea hypopnea index 38.  CPAP was ordered, just received a little over a month ago.  He is doing very well with this new equipment.  He has a ResMed air sense 11, set to 6-16, apnea hypopnea index average 1.6 over the past 30 nights, has used it 100% of nights, averaging over 9 hours per night.  Mask fit and leak good.  Very comfortable with this.  He does state that he still has some daytime sleepiness.  He does not drive.  Unlikely to fall asleep while a passenger in a car or train, might become sleepy while watching television or reading.

## 2023-03-29 NOTE — ASSESSMENT
[FreeTextEntry1] : Doing very well with CPAP, excellent compliance and efficacy.  Benefiting from usage.\par \par He does appear to have some residual daytime sleepiness despite adequate treatment.  Discussed the possibility that wakefulness promoting agents such as modafinil might be useful.  We decided not to start this now but will reconsider this when he is seen on next visit.\par \par Given long term advice about CPAP use.  Call durable medical equipment provider if any equipment problems.  Replace interface as per schedule, generally at least every 3 months.  Stressed importance of CPAP compliance.  Return to see me in about 3 months if doing well. \par \par Time spent today includes download and review of CPAP compliance and efficacy report

## 2023-07-18 ENCOUNTER — APPOINTMENT (OUTPATIENT)
Dept: ENDOCRINOLOGY | Facility: CLINIC | Age: 43
End: 2023-07-18
Payer: COMMERCIAL

## 2023-07-18 VITALS
BODY MASS INDEX: 29.97 KG/M2 | DIASTOLIC BLOOD PRESSURE: 87 MMHG | SYSTOLIC BLOOD PRESSURE: 128 MMHG | WEIGHT: 221 LBS | HEART RATE: 76 BPM

## 2023-07-18 PROCEDURE — 99214 OFFICE O/P EST MOD 30 MIN: CPT | Mod: 25

## 2023-07-18 PROCEDURE — 83036 HEMOGLOBIN GLYCOSYLATED A1C: CPT | Mod: QW

## 2023-07-18 PROCEDURE — 82962 GLUCOSE BLOOD TEST: CPT

## 2023-07-18 RX ORDER — DULAGLUTIDE 1.5 MG/.5ML
1.5 INJECTION, SOLUTION SUBCUTANEOUS
Qty: 3 | Refills: 2 | Status: ACTIVE | COMMUNITY
Start: 2022-07-27 | End: 1900-01-01

## 2023-07-21 NOTE — THERAPY
[Today's Date] : [unfilled] [FreeTextEntry7] : Trulicity 1.5 mg qw, Atorvastatin 20 mg. Xigduo XR 5-1000 one tab BID (initiated this visit 07/18/2023)

## 2023-07-21 NOTE — END OF VISIT
[FreeTextEntry3] : All medical record entries made by the Scribe were at my, Dr. Vish Thakkar, direction and personally dictated by me on 07/18/2023. I have reviewed the chart and agree that the record accurately reflects my personal performance of the history, physical exam, assessment and plan. I have also personally, directed, reviewed and agreed with the chart  [Time Spent: ___ minutes] : I have spent [unfilled] minutes of time on the encounter.

## 2023-07-21 NOTE — DATA REVIEWED
[FreeTextEntry1] : Scanned Labs/Labs Brought by Pt: \par - 07/07/23: A1c 9.1%, LDL-c 72, s.creat 0.92\par - 01/20/23; A1c 5.9%, ALT 57, s.creat 0.97 \par - 08/02/22: A1c 7.6% (H), TSH 1.85\par - 08/02/22: ([facility]): LDL-c 77, HDL 39, ACR 13\par \par

## 2023-07-21 NOTE — HISTORY OF PRESENT ILLNESS
[Continuous Glucose Monitoring] : Continuous Glucose Monitoring: Yes [FreeTextEntry1] : 41 year old M pt, with Hx of T2DM diagnosed in 2004, referred by DEMI Mackay, presents today to establish endocrine care.  \par Patient with no information of DM related complications\par No family Hx of: DM, CAD \par Home glucose test: CGM (started 06/2022). \par Lifestyle: Active- 1 hr a day 5-7x7. Meals: Eats 3 meals a day - cut down carbs. \par Last Funduscopic visit: 07/2022\par \par Other PMHx: Essential HTN, HLD, Obesity, Sleep apnea, fatty liver disease, Depression.\par FHx: Mother: HTN, mental illness. Father: HTN. Sibling: mental illness.\par Social Hx: Never smoker. No EtOH use. No recreational drug use. Works 5x7. \par Vaccine: COVID (fully)\par NKDA\par Dental visit: up to date 07/27/2022 \par \par \par 11/08/2022\par Pt presents today for DM f/u with POCT 155, /77 and BMI  31.06, feeling well with no physical complaints. He saw ophthalmologist last yr: No new diagnosis. He was dx with HTN 5-6 yrs and has been taking atorvastatin for over 10 yrs. He has never seen cardiologist. He states he has an active lifestyle and uses glucose monitor to checks BS and normally sees below 200 for  PPBS. Denies pins and needle sensation. \par \par 03/20/2023\par Pt has /77 and BMI 31.47. He gained 3 lbs in 4 months. \par Today, pt is feeling okay, with no physical complaints. \par Last ophthalmologist: Summer 2022\par Last PCP 01/2023\par Does not check BS frequently. \par  \par 07/18/2023\par CC: " I am stressed about my high BS readings.  " Pt brought BS records: PPBS: 220, 184, 247, 164, 199, 199, 153. , 144, 153, 144. He stopped Metformin at the end of April, and he noticed frequent urination. One month ago, pt restarted metformin and he has had no issue with frequent urination but his BS is still high. He states he has an active lifestyle: exercises 5x a week. Over the past two months, pt declines initiation of new medications or any new diagnosis of medical conditions. \par Pt will see Ophthalmologist end of Summer. \par  \par Current Medications: Trulicity 1.5 mg (decreased from 3 mg ; GI disturbances), Atorvastatin 20 mg, Losartan 50 mg, HCTZ 25 mg, Amlodipine 5 mg,  Lexapro 10 mg, Flonase, Omega-3 (lovaza) 1 g 2 capsules bid, Cyanocobalamin 1000 mcg\par Held: Repaglinide 0.5 mg (DCed 07/27/2022)   \par \par Medication modified/added this visit: Xigduo XR 5-1000 one tab BID (initiated this visit 07/18/2023). Hold Metformin 850 mg BID.\par  [Hypoglycemia] : Patient is not hypoglycemic.

## 2023-07-21 NOTE — ADDENDUM
[FreeTextEntry1] : I, Farhana Pickett, acted solely as a scribe for Dr. Vish Thakkar on this date 07/18/2023

## 2023-07-21 NOTE — REVIEW OF SYSTEMS
[As Noted in HPI] : as noted in HPI [Recent Weight Gain (___ Lbs)] : recent weight gain: [unfilled] lbs [Negative] : Heme/Lymph [FreeTextEntry2] : He gained 3 lbs in 4 months

## 2023-07-21 NOTE — PHYSICAL EXAM
[Alert] : alert [Well Nourished] : well nourished [No Acute Distress] : no acute distress [Well Developed] : well developed [Normal Sclera/Conjunctiva] : normal sclera/conjunctiva [EOMI] : extra ocular movement intact [No Proptosis] : no proptosis [Normal Oropharynx] : the oropharynx was normal [No Respiratory Distress] : no respiratory distress [No Accessory Muscle Use] : no accessory muscle use [Clear to Auscultation] : lungs were clear to auscultation bilaterally [Normal S1, S2] : normal S1 and S2 [Normal Rate] : heart rate was normal [Regular Rhythm] : with a regular rhythm [No Edema] : no peripheral edema [Pedal Pulses Normal] : the pedal pulses are present [Normal Bowel Sounds] : normal bowel sounds [Not Tender] : non-tender [Not Distended] : not distended [Soft] : abdomen soft [No Spinal Tenderness] : no spinal tenderness [Spine Straight] : spine straight [No Stigmata of Cushings Syndrome] : no stigmata of Cushings Syndrome [Normal Gait] : normal gait [Normal Strength/Tone] : muscle strength and tone were normal [No Rash] : no rash [Normal Reflexes] : deep tendon reflexes were 2+ and symmetric [No Tremors] : no tremors [Oriented x3] : oriented to person, place, and time [Acanthosis Nigricans] : no acanthosis nigricans [de-identified] : Nodularities in the L lobe.

## 2023-07-31 ENCOUNTER — APPOINTMENT (OUTPATIENT)
Dept: PULMONOLOGY | Facility: CLINIC | Age: 43
End: 2023-07-31
Payer: COMMERCIAL

## 2023-07-31 VITALS
HEIGHT: 72 IN | HEART RATE: 76 BPM | BODY MASS INDEX: 29.66 KG/M2 | WEIGHT: 219 LBS | TEMPERATURE: 97.9 F | SYSTOLIC BLOOD PRESSURE: 124 MMHG | DIASTOLIC BLOOD PRESSURE: 82 MMHG | OXYGEN SATURATION: 98 %

## 2023-07-31 DIAGNOSIS — G47.33 OBSTRUCTIVE SLEEP APNEA (ADULT) (PEDIATRIC): ICD-10-CM

## 2023-07-31 DIAGNOSIS — Z99.89 OBSTRUCTIVE SLEEP APNEA (ADULT) (PEDIATRIC): ICD-10-CM

## 2023-07-31 DIAGNOSIS — G47.19 OTHER HYPERSOMNIA: ICD-10-CM

## 2023-07-31 PROCEDURE — 99213 OFFICE O/P EST LOW 20 MIN: CPT

## 2023-07-31 NOTE — HISTORY OF PRESENT ILLNESS
[FreeTextEntry1] : 09/28/2022 :  MAURISIO PATEL is a 41 year old never smoker,  male with PMHx T2 DM, HTN, HLD, depression, known ANAM on CPAP who is here for initial visit.    He was dx'd with ANAM 7 years ago and was told "stopped breathing over 32 x (per hour?)". His last sleep study was 7 years ago and his CPAP is over 7 years old. He has been using his machine every night and he feels great with it, although now he is getting error messages and the machine does not send any data to him. He does not have a durable medical equipment provider   since he moved from [place] and did not bother to switch his record. He has been using resmed p10 mask and buys his supplies every 4 months.    Sleep Routine:  He goes to bed at 10P, sleep latency is about 5 min, he wakes up once, WASO is varies , and then he is up at 8A. He naps everyday x 1 hour . [place] sleepiness scale (out of 24 points) = 5  He denies cataplexy, RLS, parasomnia, or any other sleep behavioral issues.  3/29/23: After last seen had home sleep study done 10/10/2022, reviewed with patient: Sleep disordered breathing, apnea hypopnea index 38.  CPAP was ordered, just received a little over a month ago.  He is doing very well with this new equipment.  He has a ResMed air sense 11, set to 6-16, apnea hypopnea index average 1.6 over the past 30 nights, has used it 100% of nights, averaging over 9 hours per night.  Mask fit and leak good.  Very comfortable with this.  He does state that he still has some daytime sleepiness.  He does not drive.  Unlikely to fall asleep while a passenger in a car or train, might become sleepy while watching television or reading.  7/31/23:  Since last visit using CPAP withut problem, 100% use,  averages 10 h 11 min on download today for past month with P = 7.4, Apnea Hypopnea Index 1.6.  Despite this still significant excessive daytime somnolence.

## 2023-07-31 NOTE — ASSESSMENT
[FreeTextEntry1] : Doing very well with CPAP, excellent compliance and efficacy.  Benefiting from usage.  Given long term advice about CPAP use.  Call durable medical equipment provider if any equipment problems.  Replace interface as per schedule, generally at least every 3 months.  Stressed importance of CPAP compliance.  Return to see me in about 6 months if doing well.   Has obstructive sleep apnea with significant excessive daytime somnolence despite optimal rx with CPAP.  Will start trial of modafinil 200 mg for residual sleepiness, f/u one month.   Time spent today includes download and review of CPAP compliance and efficacy report

## 2023-07-31 NOTE — PHYSICAL EXAM
[General Appearance - Well Developed] : well developed [Normal Appearance] : normal appearance [Well Groomed] : well groomed [General Appearance - Well Nourished] : well nourished [No Deformities] : no deformities [General Appearance - In No Acute Distress] : no acute distress [Normal Oropharynx] : normal oropharynx [II] : II [] : no respiratory distress [Auscultation Breath Sounds / Voice Sounds] : lungs were clear to auscultation bilaterally [Oriented To Time, Place, And Person] : oriented to person, place, and time [Impaired Insight] : insight and judgment were intact [Affect] : the affect was normal

## 2023-08-28 ENCOUNTER — TRANSCRIPTION ENCOUNTER (OUTPATIENT)
Age: 43
End: 2023-08-28

## 2023-09-18 ENCOUNTER — APPOINTMENT (OUTPATIENT)
Dept: ENDOCRINOLOGY | Facility: CLINIC | Age: 43
End: 2023-09-18

## 2023-09-18 ENCOUNTER — TRANSCRIPTION ENCOUNTER (OUTPATIENT)
Age: 43
End: 2023-09-18

## 2023-09-18 LAB
ANION GAP SERPL CALC-SCNC: 15 MMOL/L
BUN SERPL-MCNC: 14 MG/DL
CALCIUM SERPL-MCNC: 9.9 MG/DL
CHLORIDE SERPL-SCNC: 100 MMOL/L
CHOLEST SERPL-MCNC: 138 MG/DL
CO2 SERPL-SCNC: 25 MMOL/L
CREAT SERPL-MCNC: 0.94 MG/DL
CREAT SPEC-SCNC: 115 MG/DL
EGFR: 103 ML/MIN/1.73M2
GLUCOSE BLDC GLUCOMTR-MCNC: 185
GLUCOSE SERPL-MCNC: 150 MG/DL
HBA1C MFR BLD HPLC: 8.3
HDLC SERPL-MCNC: 37 MG/DL
LDLC SERPL CALC-MCNC: 71 MG/DL
MICROALBUMIN 24H UR DL<=1MG/L-MCNC: 3 MG/DL
MICROALBUMIN/CREAT 24H UR-RTO: 26 MG/G
NONHDLC SERPL-MCNC: 101 MG/DL
POTASSIUM SERPL-SCNC: 4 MMOL/L
SODIUM SERPL-SCNC: 140 MMOL/L
TRIGL SERPL-MCNC: 179 MG/DL
TSH SERPL-ACNC: 2.02 UIU/ML

## 2023-09-18 RX ORDER — MODAFINIL 200 MG/1
200 TABLET ORAL
Qty: 30 | Refills: 0 | Status: COMPLETED | COMMUNITY
Start: 2023-07-31 | End: 2023-09-18

## 2023-09-22 ENCOUNTER — TRANSCRIPTION ENCOUNTER (OUTPATIENT)
Age: 43
End: 2023-09-22

## 2023-12-12 ENCOUNTER — APPOINTMENT (OUTPATIENT)
Dept: ENDOCRINOLOGY | Facility: CLINIC | Age: 43
End: 2023-12-12
Payer: COMMERCIAL

## 2023-12-12 VITALS
BODY MASS INDEX: 30.11 KG/M2 | WEIGHT: 222 LBS | HEART RATE: 82 BPM | SYSTOLIC BLOOD PRESSURE: 141 MMHG | DIASTOLIC BLOOD PRESSURE: 81 MMHG

## 2023-12-12 DIAGNOSIS — E11.65 TYPE 2 DIABETES MELLITUS WITH HYPERGLYCEMIA: ICD-10-CM

## 2023-12-12 DIAGNOSIS — E04.9 NONTOXIC GOITER, UNSPECIFIED: ICD-10-CM

## 2023-12-12 PROCEDURE — 99214 OFFICE O/P EST MOD 30 MIN: CPT

## 2023-12-13 NOTE — THERAPY
[FreeTextEntry7] : Trulicity 1.5 mg qw, Atorvastatin 20 mg. Xigduo XR 5-1000 one tab BID (initiated this visit 07/18/2023)

## 2023-12-13 NOTE — DATA REVIEWED
[FreeTextEntry1] : Scanned Labs/Labs Brought by Pt:   - 12/12/2023: POCT A1c 6.7% - 07/07/23: A1c 9.1%, LDL-c 72, s.creat 0.92 - 01/20/23; A1c 5.9%, ALT 57, s.creat 0.97  - 08/02/22: A1c 7.6% (H), TSH 1.85 - 08/02/22: ([facility]): LDL-c 77, HDL 39, ACR 13

## 2023-12-13 NOTE — ADDENDUM
[FreeTextEntry1] : I, Farhana Pickett, acted solely as a scribe for Dr. Vish Thakkar on this date 12/12/2023

## 2023-12-13 NOTE — HISTORY OF PRESENT ILLNESS
[FreeTextEntry1] : 43 year old M pt, with Hx of T2DM diagnosed in 2004, referred by DEMI Mackay, presents today to establish endocrine care.   Patient with no information of DM related complications No family Hx of: DM, CAD  Home glucose test: CGM (started 06/2022).  Lifestyle: Active- 1 hr a day 5-7x7. Meals: Eats 3 meals a day - cut down carbs.  Last Funduscopic visit: 07/2022  Other PMHx: Essential HTN, HLD, Obesity, Sleep apnea, fatty liver disease, Depression. FHx: Mother: HTN, mental illness. Father: HTN. Sibling: mental illness. Social Hx: Never smoker. No EtOH use. No recreational drug use. Works 5x7.  Vaccine: COVID (fully) NKDA Dental visit: up to date 07/27/2022 11/08/2022 Pt presents today for DM f/u with POCT 155, /77 and BMI  31.06, feeling well with no physical complaints. He saw ophthalmologist last yr: No new diagnosis. He was dx with HTN 5-6 yrs and has been taking atorvastatin for over 10 yrs. He has never seen cardiologist. He states he has an active lifestyle and uses glucose monitor to checks BS and normally sees below 200 for  PPBS. Denies pins and needle sensation.   03/20/2023 Pt has /77 and BMI 31.47. He gained 3 lbs in 4 months.  Today, pt is feeling okay, with no physical complaints.  Last ophthalmologist: Summer 2022 Last PCP 01/2023 Does not check BS frequently.    07/18/2023 CC: " I am stressed about my high BS readings.  " Pt brought BS records: PPBS: 220, 184, 247, 164, 199, 199, 153. , 144, 153, 144. He stopped Metformin at the end of April, and he noticed frequent urination. One month ago, pt restarted metformin and he has had no issue with frequent urination but his BS is still high. He states he has an active lifestyle: exercises 5x a week. Over the past two months, pt declines initiation of new medications or any new diagnosis of medical conditions.  Pt will see Ophthalmologist end of Summer.   12/12/2023 CC: " I am feeling fine" Pt reports that blood sugar readings have been better than expected. His latest PPBS was 133. Pt states that he followed up with an ophthalmologist 3 months ago and also had a full foot workup by a podiatrist. Pt denies pins and needle sensation in LE.   Current Medications: Xigduo XR 5-1000 one tab BID (initiated this visit 07/18/2023). Trulicity 1.5 mg (decreased from 3 mg ; GI disturbances), Atorvastatin 20 mg, Losartan 50 mg, HCTZ 25 mg, Amlodipine 5 mg,  Lexapro 10 mg, Flonase, Omega-3 (lovaza) 1 g 2 capsules bid, Cyanocobalamin 1000 mcg - Held: Repaglinide 0.5 mg (DCed 07/27/2022), Metformin 850 mg BID.    [Hypoglycemia] : Patient is not hypoglycemic.

## 2023-12-13 NOTE — END OF VISIT
[FreeTextEntry3] : All medical record entries made by the Scribe were at my, Dr. Vish Thakkar, direction and personally dictated by me on 12/12/2023. I have reviewed the chart and agree that the record accurately reflects my personal performance of the history, physical exam, assessment and plan. I have also personally, directed, reviewed and agreed with the chart  [Time Spent: ___ minutes] : I have spent [unfilled] minutes of time on the encounter.

## 2023-12-14 ENCOUNTER — RX RENEWAL (OUTPATIENT)
Age: 43
End: 2023-12-14

## 2023-12-19 ENCOUNTER — NON-APPOINTMENT (OUTPATIENT)
Age: 43
End: 2023-12-19

## 2023-12-20 LAB
ANION GAP SERPL CALC-SCNC: 16 MMOL/L
BUN SERPL-MCNC: 16 MG/DL
CALCIUM SERPL-MCNC: 10.3 MG/DL
CHLORIDE SERPL-SCNC: 98 MMOL/L
CHOLEST SERPL-MCNC: 144 MG/DL
CO2 SERPL-SCNC: 27 MMOL/L
CREAT SERPL-MCNC: 1.04 MG/DL
CREAT SPEC-SCNC: 62 MG/DL
EGFR: 91 ML/MIN/1.73M2
GLUCOSE BLDC GLUCOMTR-MCNC: 133
GLUCOSE SERPL-MCNC: 126 MG/DL
HDLC SERPL-MCNC: 38 MG/DL
LDLC SERPL CALC-MCNC: 64 MG/DL
MICROALBUMIN 24H UR DL<=1MG/L-MCNC: 1.5 MG/DL
MICROALBUMIN/CREAT 24H UR-RTO: 24 MG/G
NONHDLC SERPL-MCNC: 106 MG/DL
POTASSIUM SERPL-SCNC: 3.5 MMOL/L
SODIUM SERPL-SCNC: 141 MMOL/L
TRIGL SERPL-MCNC: 267 MG/DL

## 2024-02-14 ENCOUNTER — TRANSCRIPTION ENCOUNTER (OUTPATIENT)
Age: 44
End: 2024-02-14

## 2024-02-14 RX ORDER — SEMAGLUTIDE 1.34 MG/ML
4 INJECTION, SOLUTION SUBCUTANEOUS
Qty: 6 | Refills: 1 | Status: ACTIVE | COMMUNITY
Start: 2024-02-14 | End: 1900-01-01

## 2024-06-05 ENCOUNTER — TRANSCRIPTION ENCOUNTER (OUTPATIENT)
Age: 44
End: 2024-06-05

## 2024-06-05 RX ORDER — MODAFINIL 100 MG/1
100 TABLET ORAL
Qty: 60 | Refills: 0 | Status: ACTIVE | COMMUNITY
Start: 2023-09-18 | End: 1900-01-01

## 2024-06-10 ENCOUNTER — APPOINTMENT (OUTPATIENT)
Dept: ENDOCRINOLOGY | Facility: CLINIC | Age: 44
End: 2024-06-10
Payer: COMMERCIAL

## 2024-06-10 VITALS
WEIGHT: 209 LBS | DIASTOLIC BLOOD PRESSURE: 75 MMHG | SYSTOLIC BLOOD PRESSURE: 117 MMHG | HEIGHT: 72 IN | HEART RATE: 78 BPM | BODY MASS INDEX: 28.31 KG/M2

## 2024-06-10 DIAGNOSIS — E11.9 TYPE 2 DIABETES MELLITUS W/OUT COMPLICATIONS: ICD-10-CM

## 2024-06-10 PROCEDURE — 83036 HEMOGLOBIN GLYCOSYLATED A1C: CPT | Mod: QW

## 2024-06-10 PROCEDURE — 99214 OFFICE O/P EST MOD 30 MIN: CPT

## 2024-06-10 RX ORDER — DAPAGLIFLOZIN AND METFORMIN HYDROCHLORIDE 5; 1000 MG/1; MG/1
5-1000 TABLET, FILM COATED, EXTENDED RELEASE ORAL
Qty: 90 | Refills: 3 | Status: ACTIVE | COMMUNITY
Start: 2023-07-18 | End: 1900-01-01

## 2024-06-10 RX ORDER — SEMAGLUTIDE 1.34 MG/ML
4 INJECTION, SOLUTION SUBCUTANEOUS
Qty: 6 | Refills: 3 | Status: ACTIVE | COMMUNITY
Start: 2024-06-10 | End: 1900-01-01

## 2024-06-10 NOTE — HISTORY OF PRESENT ILLNESS
[Continuous Glucose Monitoring] : Continuous Glucose Monitoring: Yes [FreeTextEntry1] : 43 year old M pt, with Hx of T2DM diagnosed in 2004, referred by DEMI Mackay, presents today to establish endocrine care.   Patient with no information of DM related complications No family Hx of: DM, CAD  Home glucose test: CGM (started 06/2022).  Lifestyle: Active- 1 hr a day 5-7x7. Meals: Eats 3 meals a day - cut down carbs.  Last Funduscopic visit: 07/2022  Other PMHx: Essential HTN, HLD, Obesity, Sleep apnea, fatty liver disease, Depression. FHx: Mother: HTN, mental illness. Father: HTN. Sibling: mental illness. Social Hx: Never smoker. No EtOH use. No recreational drug use. Works 5x7.  Vaccine: COVID (fully) NKDA Dental visit: up to date 07/27/2022 11/08/2022 Pt presents today for DM f/u with POCT 155, /77 and BMI  31.06, feeling well with no physical complaints. He saw ophthalmologist last yr: No new diagnosis. He was dx with HTN 5-6 yrs and has been taking atorvastatin for over 10 yrs. He has never seen cardiologist. He states he has an active lifestyle and uses glucose monitor to checks BS and normally sees below 200 for  PPBS. Denies pins and needle sensation.   03/20/2023 Pt has /77 and BMI 31.47. He gained 3 lbs in 4 months.  Today, pt is feeling okay, with no physical complaints.  Last ophthalmologist: Summer 2022 Last PCP 01/2023 Does not check BS frequently.    07/18/2023 CC: " I am stressed about my high BS readings.  " Pt brought BS records: PPBS: 220, 184, 247, 164, 199, 199, 153. , 144, 153, 144. He stopped Metformin at the end of April, and he noticed frequent urination. One month ago, pt restarted metformin and he has had no issue with frequent urination but his BS is still high. He states he has an active lifestyle: exercises 5x a week. Over the past two months, pt declines initiation of new medications or any new diagnosis of medical conditions.  Pt will see Ophthalmologist end of Summer.   12/12/2023 CC: " I am feeling fine" Pt reports that blood sugar readings have been better than expected. His latest PPBS was 133. Pt states that he followed up with an ophthalmologist 3 months ago and also had a full foot workup by a podiatrist. Pt denies pins and needle sensation in LE.   06/10/2024  Pt has /75 and BMI 28.35. He lost 13 lbs in 6 months. CC: "I'm doing great. Pt is feeling great with no physical complaints. He is happy with his weight loss and will follow up with his ophthalmologist at the end of summer.  Pt brought in labs: 04/25/24 LDL-c 64, ALT 78 (9-46), H/H 17.2/50.8, A1c 5.9% 05/14/24 TSH 1.41,  [Medications verified as per pt on 06/10/2024] Current Medications: Xigduo XR 5-1000 one tab BID (initiated this visit 07/18/2023). Ozempic 1.0 mg, Trulicity 1.5 mg (decreased from 3 mg ; GI disturbances), Atorvastatin 20 mg, Losartan 50 mg, HCTZ 25 mg, Amlodipine 5 mg,  Lexapro 10 mg, Flonase, Omega-3 (lovaza) 1 g 2 capsules bid, Cyanocobalamin 1000 mcg - Held: Repaglinide 0.5 mg (DCed 07/27/2022), Metformin 850 mg BID.    Medication modified/added this visit: Decrease Xigduo XR 5-1000 to qd [Hypoglycemia] : Patient is not hypoglycemic.

## 2024-06-10 NOTE — PHYSICAL EXAM
[Alert] : alert [Well Nourished] : well nourished [No Acute Distress] : no acute distress [Well Developed] : well developed [Normal Sclera/Conjunctiva] : normal sclera/conjunctiva [EOMI] : extra ocular movement intact [No Proptosis] : no proptosis [Normal Oropharynx] : the oropharynx was normal [No Respiratory Distress] : no respiratory distress [No Accessory Muscle Use] : no accessory muscle use [Clear to Auscultation] : lungs were clear to auscultation bilaterally [Normal S1, S2] : normal S1 and S2 [Normal Rate] : heart rate was normal [Regular Rhythm] : with a regular rhythm [No Edema] : no peripheral edema [Pedal Pulses Normal] : the pedal pulses are present [Normal Bowel Sounds] : normal bowel sounds [Not Tender] : non-tender [Not Distended] : not distended [Soft] : abdomen soft [No Spinal Tenderness] : no spinal tenderness [Spine Straight] : spine straight [No Stigmata of Cushings Syndrome] : no stigmata of Cushings Syndrome [Normal Gait] : normal gait [Normal Strength/Tone] : muscle strength and tone were normal [No Rash] : no rash [Normal Reflexes] : deep tendon reflexes were 2+ and symmetric [No Tremors] : no tremors [Oriented x3] : oriented to person, place, and time [Acanthosis Nigricans] : no acanthosis nigricans [de-identified] : Nodularities in the L lobe.

## 2024-06-10 NOTE — END OF VISIT
[FreeTextEntry3] :  All medical record entries made by the Scribe were at my, Dr. Vish Thakkar, direction and personally dictated by me on 06/10/2024. I have reviewed the chart and agree that the record accurately reflects my personal performance of the history, physical exam, assessment and plan. I have also personally directed, reviewed and agreed with the chart. [Time Spent: ___ minutes] : I have spent [unfilled] minutes of time on the encounter.

## 2024-06-10 NOTE — DATA REVIEWED
[FreeTextEntry1] : Scanned Labs/Labs Brought by Pt:   - 04/25/24 LDL-c 64, ALT 78 (9-46), H/H 17.2/50.8, A1c 5.9% - 05/14/24 TSH 1.41, - 12/12/2023: POCT A1c 6.7% - 07/07/23: A1c 9.1%, LDL-c 72, s.creat 0.92 - 01/20/23; A1c 5.9%, ALT 57, s.creat 0.97  - 08/02/22: A1c 7.6% (H), TSH 1.85 - 08/02/22: ([facility]): LDL-c 77, HDL 39, ACR 13

## 2024-06-12 LAB — HBA1C MFR BLD HPLC: 5.4

## 2024-08-13 ENCOUNTER — NON-APPOINTMENT (OUTPATIENT)
Age: 44
End: 2024-08-13

## 2024-08-14 ENCOUNTER — APPOINTMENT (OUTPATIENT)
Dept: PULMONOLOGY | Facility: CLINIC | Age: 44
End: 2024-08-14
Payer: COMMERCIAL

## 2024-08-14 VITALS
HEIGHT: 72 IN | WEIGHT: 214 LBS | RESPIRATION RATE: 12 BRPM | SYSTOLIC BLOOD PRESSURE: 113 MMHG | OXYGEN SATURATION: 98 % | DIASTOLIC BLOOD PRESSURE: 78 MMHG | BODY MASS INDEX: 28.99 KG/M2 | TEMPERATURE: 97.6 F | HEART RATE: 75 BPM

## 2024-08-14 DIAGNOSIS — G47.33 OBSTRUCTIVE SLEEP APNEA (ADULT) (PEDIATRIC): ICD-10-CM

## 2024-08-14 DIAGNOSIS — G47.14 HYPERSOMNIA DUE TO MEDICAL CONDITION: ICD-10-CM

## 2024-08-14 PROCEDURE — 99214 OFFICE O/P EST MOD 30 MIN: CPT

## 2024-08-14 NOTE — REVIEW OF SYSTEMS
[Negative] : Psychiatric [EDS: ESS=____] : no daytime somnolence [Nasal Congestion] : no nasal congestion [Snoring] : no snoring [Witnessed Apneas] : no witnessed apnea [Shortness Of Breath] : no shortness of breath

## 2024-08-14 NOTE — PHYSICAL EXAM
[General Appearance - Well Developed] : well developed [General Appearance - Well Nourished] : well nourished [Normal Conjunctiva] : the conjunctiva exhibited no abnormalities [Normal Oropharynx] : normal oropharynx [Neck Appearance] : the appearance of the neck was normal [Apical Impulse] : the apical impulse was normal [Heart Rate And Rhythm] : heart rate was normal and rhythm regular [] : no respiratory distress [Nail Clubbing] : no clubbing of the fingernails [Cyanosis, Localized] : no localized cyanosis [Oriented To Time, Place, And Person] : oriented to person, place, and time

## 2024-08-14 NOTE — END OF VISIT
[] : Fellow [FreeTextEntry3] : excessive daytime somnolence despite good CPAP rx, modafinil helpful.  At present in bed with neck pain, does not need modafinil [Time Spent: ___ minutes] : I have spent [unfilled] minutes of time on the encounter.

## 2024-08-14 NOTE — ASSESSMENT
[FreeTextEntry1] : Mr. Valdez is a 41 year old never smoker,  male with PMHx T2 DM, HTN, HLD, depression, severe ANAM on CPAP with residual daytime sleepiness despite effective control of SDB.  Report 7/15/24-8/13/% compliance,  long time in bed because of neck pain now, average use 10 hours 11 minutes per night, residual AHI 1.2/hr 95th percentile leak 2.4 L/min, 95th percentile 9 cmH2O.   Doing very well with CPAP, excellent compliance and efficacy.  Benefiting from usage.  Given long term advice about CPAP use.  Call durable medical equipment provider if any equipment problems.  Replace interface as per schedule, generally at least every 3 months.  Stressed importance of CPAP compliance.  Return to see me in about 6 months if doing well.   Time spent today includes download and review of CPAP compliance and efficacy report   Plan: Continue modafinil 100 mg q daily PRN for daytime sleepiness - do not exceed 200 mg qd Continue CPAP

## 2024-08-14 NOTE — HISTORY OF PRESENT ILLNESS
[Obstructive Sleep Apnea] : obstructive sleep apnea [Snoring] : snoring [Daytime Somnolence] : daytime somnolence [FreeTextEntry1] : 09/28/2022 : MAURISIO PATEL is a 41 year old never smoker,  male with PMHx T2 DM, HTN, HLD, depression, known ANAM on CPAP who is here for initial visit.  He was dx'd with ANAM 7 years ago and was told "stopped breathing over 32 x (per hour?)". His last sleep study was 7 years ago and his CPAP is over 7 years old. He has been using his machine every night and he feels great with it, although now he is getting error messages and the machine does not send any data to him. He does not have a durable medical equipment provider since he moved from [place] and did not bother to switch his record. He has been using resmed p10 mask and buys his supplies every 4 months.  Sleep study 10/10/22: AHI 38, carlos sat 80%, T 89%- 24 min  Sleep Routine:  He goes to bed at 10P, sleep latency is about 5 min, he wakes up once, WASO is varies , and then he is up at 8A. He naps everyday x 1 hour. [place] sleepiness scale (out of 24 points) = 5  He denies cataplexy, RLS, parasomnia, or any other sleep behavioral issues.  2/16/23: initiated CPAP- Airsense 11 Autoset- 6-16 cmH2O  3/29/23: After last seen had home sleep study done 10/10/2022, reviewed with patient: Sleep disordered breathing, apnea hypopnea index 38. CPAP was ordered, just received a little over a month ago. He is doing very well with this new equipment. He has a ResMed air sense 11, set to 6-16, apnea hypopnea index average 1.6 over the past 30 nights, has used it 100% of nights, averaging over 9 hours per night. Mask fit and leak good. Very comfortable with this. He does state that he still has some daytime sleepiness. He does not drive. Unlikely to fall asleep while a passenger in a car or train, might become sleepy while watching television or reading.  7/31/23: Since last visit using CPAP withut problem, 100% use, averages 10 h 11 min on download today for past month with P = 7.4, Apnea Hypopnea Index 1.6. Despite this still significant excessive daytime somnolence and he was started on modafinil.   8/14/24: Using CPAP without a problem however is spending more time in bed due to pain from cervical  radiculopathy. He had been using modafinil to help his daytime sleepiness, does not need to use it now that sleeping more. Compliance report from 7/15/24-8/13/% compliance, average use 10 hours 11 minutes per night, residual AHI 1.2/hr 95th percentile leak 2.4 L/min, 95th percentile 9 cmH2O.  [Witnessed Apneas] : no witnessed sleep apnea [Frequent Nocturnal Awakening] : no nocturnal awakening [Unintentional Sleep while Active] : no unintentional sleep while active [Unintentional Sleep While Inactive] : no unintentional sleep while inactive [Awakes Unrefreshed] : does not awake unrefreshed [Awakes with Headache] : no headache upon awakening [Awakening With Dry Mouth] : no dry mouth upon awakening [Recent  Weight Gain] : no recent weight gain [DIS] : no DIS [DMS] : no DMS [Unusual Sleep Behavior] : no unusual sleep behavior [Hypersomnolence] : no hypersomnolence [Cataplexy] : no cataplexy [Sleep Paralysis] : no sleep paralysis [Hypnagogic Hallucinations] : no hallucinations when falling asleep [Hypnopompic Hallucinations] : no hallucinations when awakening

## 2024-11-25 ENCOUNTER — TRANSCRIPTION ENCOUNTER (OUTPATIENT)
Age: 44
End: 2024-11-25

## 2024-12-02 ENCOUNTER — LABORATORY RESULT (OUTPATIENT)
Age: 44
End: 2024-12-02

## 2024-12-06 ENCOUNTER — TRANSCRIPTION ENCOUNTER (OUTPATIENT)
Age: 44
End: 2024-12-06

## 2024-12-10 ENCOUNTER — APPOINTMENT (OUTPATIENT)
Dept: ENDOCRINOLOGY | Facility: CLINIC | Age: 44
End: 2024-12-10
Payer: COMMERCIAL

## 2024-12-10 VITALS
BODY MASS INDEX: 29.84 KG/M2 | DIASTOLIC BLOOD PRESSURE: 76 MMHG | WEIGHT: 220 LBS | SYSTOLIC BLOOD PRESSURE: 116 MMHG | HEART RATE: 74 BPM

## 2024-12-10 DIAGNOSIS — E11.9 TYPE 2 DIABETES MELLITUS W/OUT COMPLICATIONS: ICD-10-CM

## 2024-12-10 DIAGNOSIS — E11.65 TYPE 2 DIABETES MELLITUS WITH HYPERGLYCEMIA: ICD-10-CM

## 2024-12-10 PROCEDURE — G2211 COMPLEX E/M VISIT ADD ON: CPT | Mod: NC

## 2024-12-10 PROCEDURE — 99214 OFFICE O/P EST MOD 30 MIN: CPT

## 2024-12-12 LAB — GLUCOSE BLDC GLUCOMTR-MCNC: 165

## 2025-06-07 ENCOUNTER — NON-APPOINTMENT (OUTPATIENT)
Age: 45
End: 2025-06-07

## 2025-06-09 ENCOUNTER — NON-APPOINTMENT (OUTPATIENT)
Age: 45
End: 2025-06-09

## 2025-06-09 ENCOUNTER — APPOINTMENT (OUTPATIENT)
Dept: ENDOCRINOLOGY | Facility: CLINIC | Age: 45
End: 2025-06-09
Payer: COMMERCIAL

## 2025-06-09 VITALS
DIASTOLIC BLOOD PRESSURE: 79 MMHG | HEIGHT: 72 IN | BODY MASS INDEX: 30.88 KG/M2 | WEIGHT: 228 LBS | SYSTOLIC BLOOD PRESSURE: 127 MMHG | HEART RATE: 72 BPM

## 2025-06-09 PROCEDURE — 99214 OFFICE O/P EST MOD 30 MIN: CPT

## 2025-06-09 RX ORDER — ATORVASTATIN CALCIUM 20 MG/1
20 TABLET, FILM COATED ORAL
Qty: 90 | Refills: 3 | Status: ACTIVE | COMMUNITY
Start: 2025-06-09 | End: 1900-01-01

## 2025-08-22 ENCOUNTER — APPOINTMENT (OUTPATIENT)
Dept: PULMONOLOGY | Facility: CLINIC | Age: 45
End: 2025-08-22
Payer: COMMERCIAL

## 2025-08-22 VITALS
HEIGHT: 72 IN | TEMPERATURE: 97.2 F | SYSTOLIC BLOOD PRESSURE: 124 MMHG | RESPIRATION RATE: 12 BRPM | OXYGEN SATURATION: 96 % | WEIGHT: 233 LBS | HEART RATE: 70 BPM | BODY MASS INDEX: 31.56 KG/M2 | DIASTOLIC BLOOD PRESSURE: 79 MMHG

## 2025-08-22 DIAGNOSIS — R40.0 SOMNOLENCE: ICD-10-CM

## 2025-08-22 DIAGNOSIS — G47.33 OBSTRUCTIVE SLEEP APNEA (ADULT) (PEDIATRIC): ICD-10-CM

## 2025-08-22 DIAGNOSIS — R06.83 SNORING: ICD-10-CM

## 2025-08-22 PROCEDURE — 99204 OFFICE O/P NEW MOD 45 MIN: CPT

## 2025-08-22 PROCEDURE — G2211 COMPLEX E/M VISIT ADD ON: CPT | Mod: NC

## 2025-08-25 PROBLEM — R40.0 DAYTIME SLEEPINESS: Status: ACTIVE | Noted: 2025-08-25

## 2025-08-25 PROBLEM — R06.83 SNORING: Status: ACTIVE | Noted: 2025-08-25
